# Patient Record
Sex: FEMALE | Race: WHITE | Employment: FULL TIME | URBAN - METROPOLITAN AREA
[De-identification: names, ages, dates, MRNs, and addresses within clinical notes are randomized per-mention and may not be internally consistent; named-entity substitution may affect disease eponyms.]

---

## 2020-02-18 ENCOUNTER — OFFICE VISIT (OUTPATIENT)
Dept: FAMILY MEDICINE CLINIC | Age: 35
End: 2020-02-18
Payer: COMMERCIAL

## 2020-02-18 VITALS
SYSTOLIC BLOOD PRESSURE: 112 MMHG | WEIGHT: 133 LBS | DIASTOLIC BLOOD PRESSURE: 72 MMHG | BODY MASS INDEX: 24.48 KG/M2 | HEART RATE: 67 BPM | HEIGHT: 62 IN | OXYGEN SATURATION: 97 %

## 2020-02-18 PROBLEM — M54.50 CHRONIC RIGHT-SIDED LOW BACK PAIN WITHOUT SCIATICA: Status: ACTIVE | Noted: 2020-02-18

## 2020-02-18 PROBLEM — G89.29 CHRONIC RIGHT-SIDED LOW BACK PAIN WITHOUT SCIATICA: Status: ACTIVE | Noted: 2020-02-18

## 2020-02-18 PROBLEM — F33.1 MODERATE EPISODE OF RECURRENT MAJOR DEPRESSIVE DISORDER (HCC): Status: ACTIVE | Noted: 2020-02-18

## 2020-02-18 PROBLEM — G56.21 NEUROPATHY OF RIGHT ULNAR NERVE AT WRIST: Status: ACTIVE | Noted: 2020-02-18

## 2020-02-18 PROCEDURE — G8484 FLU IMMUNIZE NO ADMIN: HCPCS | Performed by: FAMILY MEDICINE

## 2020-02-18 PROCEDURE — G8427 DOCREV CUR MEDS BY ELIG CLIN: HCPCS | Performed by: FAMILY MEDICINE

## 2020-02-18 PROCEDURE — 99203 OFFICE O/P NEW LOW 30 MIN: CPT | Performed by: FAMILY MEDICINE

## 2020-02-18 PROCEDURE — G8420 CALC BMI NORM PARAMETERS: HCPCS | Performed by: FAMILY MEDICINE

## 2020-02-18 PROCEDURE — 4004F PT TOBACCO SCREEN RCVD TLK: CPT | Performed by: FAMILY MEDICINE

## 2020-02-18 RX ORDER — SERTRALINE HYDROCHLORIDE 100 MG/1
150 TABLET, FILM COATED ORAL DAILY
Qty: 45 TABLET | Refills: 0 | COMMUNITY
Start: 2020-02-18 | End: 2020-02-28 | Stop reason: SDUPTHER

## 2020-02-18 RX ORDER — SERTRALINE HYDROCHLORIDE 100 MG/1
100 TABLET, FILM COATED ORAL DAILY
COMMUNITY
End: 2020-02-18 | Stop reason: SDUPTHER

## 2020-02-18 RX ORDER — TRAZODONE HYDROCHLORIDE 100 MG/1
100 TABLET ORAL DAILY
COMMUNITY
End: 2020-06-23 | Stop reason: SDUPTHER

## 2020-02-18 SDOH — HEALTH STABILITY: MENTAL HEALTH: HOW OFTEN DO YOU HAVE A DRINK CONTAINING ALCOHOL?: NEVER

## 2020-02-18 ASSESSMENT — ENCOUNTER SYMPTOMS: RESPIRATORY NEGATIVE: 1

## 2020-02-18 NOTE — PROGRESS NOTES
Subjective:      Patient ID: Kay Kirby is a 38 y.o. female. HPI Pt is a of 38 y.o. female comes in today with   Chief Complaint   Patient presents with    New Patient     Patient needs to establish with new PCP. History of anxiety, depression and mood swings. On zoloft since 2019. Just recently put on trazodone. [facility] with psychiatrist [facility]. Has been healthy. Some right lower back pain. Worse if on feet too long. Going on for 2 years. Just takes ibu prn. No Known Allergies    Current Outpatient Medications on File Prior to Visit   Medication Sig Dispense Refill    sertraline (ZOLOFT) 100 MG tablet Take 100 mg by mouth daily       traZODone (DESYREL) 100 MG tablet Take 100 mg by mouth daily        No current facility-administered medications on file prior to visit.         Past Medical History:   Diagnosis Date    Anxiety     Depression     Insomnia     Mood swing        Past Surgical History:   Procedure Laterality Date     SECTION      EYE SURGERY         Social History     Socioeconomic History    Marital status: Single     Spouse name: None    Number of children: None    Years of education: None    Highest education level: None   Occupational History    None   Social Needs    Financial resource strain: None    Food insecurity:     Worry: None     Inability: None    Transportation needs:     Medical: None     Non-medical: None   Tobacco Use    Smoking status: Current Every Day Smoker     Packs/day: 0.35     Years: 20.00     Pack years: 7.00     Start date: 2000    Smokeless tobacco: Never Used   Substance and Sexual Activity    Alcohol use: Never     Frequency: Never    Drug use: Never    Sexual activity: None   Lifestyle    Physical activity:     Days per week: None     Minutes per session: None    Stress: None   Relationships    Social connections:     Talks on phone: None     Gets together: None     Attends Protestant service: None     Active member of club or organization: None     Attends meetings of clubs or organizations: None     Relationship status: None    Intimate partner violence:     Fear of current or ex partner: None     Emotionally abused: None     Physically abused: None     Forced sexual activity: None   Other Topics Concern    None   Social History Narrative    None       Social History     Substance and Sexual Activity   Drug Use Never       No family history on file. Review of Systems   Constitutional: Negative. Respiratory: Negative. Cardiovascular: Negative. Vitals:    02/18/20 1023   BP: 112/72   Site: Left Upper Arm   Position: Sitting   Cuff Size: Small Adult   Pulse: 67   SpO2: 97%   Weight: 133 lb (60.3 kg)   Height: 5' 2\" (1.575 m)      Objective:   Physical Exam  Constitutional:       General: She is not in acute distress. Appearance: She is well-developed. She is not diaphoretic. HENT:      Head: Normocephalic and atraumatic. Eyes:      General: No scleral icterus. Neck:      Musculoskeletal: Normal range of motion and neck supple. Thyroid: No thyroid mass or thyromegaly. Trachea: No tracheal deviation. Cardiovascular:      Rate and Rhythm: Normal rate and regular rhythm. Heart sounds: Normal heart sounds. No murmur. Pulmonary:      Effort: Pulmonary effort is normal.      Breath sounds: Normal breath sounds. Lymphadenopathy:      Head:      Right side of head: No submandibular adenopathy. Left side of head: No submandibular adenopathy. Cervical: No cervical adenopathy. Skin:     General: Skin is warm and dry. Findings: No rash. Neurological:      Mental Status: She is alert and oriented to person, place, and time. Cranial Nerves: No cranial nerve deficit. Psychiatric:         Behavior: Behavior normal.         Thought Content: Thought content normal.         Judgment: Judgment normal.         Assessment:       Diagnosis Orders   1.  Moderate episode of recurrent major depressive disorder ([facility] [address]     2. Chronic right-sided low back pain without sciatica     3. 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          Chrissie Avalos MD

## 2020-02-18 NOTE — PATIENT INSTRUCTIONS
floor on your back with your knees bent at a 90-degree angle. Your feet should be flat on the floor, about 12 inches from your buttocks. 2. Cross your arms over your chest. If this bothers your neck, try putting your hands behind your neck (not your head), with your elbows spread apart. 3. Slowly tighten your belly muscles and raise your shoulder blades off the floor. 4. Keep your head in line with your body, and do not press your chin to your chest.  5. Hold this position for 1 or 2 seconds, then slowly lower yourself back down to the floor. 6. Repeat 8 to 12 times. Pelvic tilt exercise   1. Lie on your back with your knees bent. 2. \"Brace\" your stomach. This means to tighten your muscles by pulling in and imagining your belly button moving toward your spine. You should feel like your back is pressing to the floor and your hips and pelvis are rocking back. 3. Hold for about 6 seconds while you breathe smoothly. 4. Repeat 8 to 12 times. Heel dig bridging   1. Lie on your back with both knees bent and your ankles bent so that only your heels are digging into the floor. Your knees should be bent about 90 degrees. 2. Then push your heels into the floor, squeeze your buttocks, and lift your hips off the floor until your shoulders, hips, and knees are all in a straight line. 3. Hold for about 6 seconds as you continue to breathe normally, and then slowly lower your hips back down to the floor and rest for up to 10 seconds. 4. Do 8 to 12 repetitions. Hamstring stretch in doorway   1. Lie on your back in a doorway, with one leg through the open door. 2. Slide your leg up the wall to straighten your knee. You should feel a gentle stretch down the back of your leg. 3. Hold the stretch for at least 15 to 30 seconds. Do not arch your back, point your toes, or bend either knee. Keep one heel touching the floor and the other heel touching the wall. 4. Repeat with your other leg.   5. Do 2 to 4 times for each leg.    Hip flexor stretch   1. Kneel on the floor with one knee bent and one leg behind you. Place your forward knee over your foot. Keep your other knee touching the floor. 2. Slowly push your hips forward until you feel a stretch in the upper thigh of your rear leg. 3. Hold the stretch for at least 15 to 30 seconds. Repeat with your other leg. 4. Do 2 to 4 times on each side. Wall sit   1. Stand with your back 10 to 12 inches away from a wall. 2. Lean into the wall until your back is flat against it. 3. Slowly slide down until your knees are slightly bent, pressing your lower back into the wall. 4. Hold for about 6 seconds, then slide back up the wall. 5. Repeat 8 to 12 times. Follow-up care is a key part of your treatment and safety. Be sure to make and go to all appointments, and call your doctor if you are having problems. It's also a good idea to know your test results and keep a list of the medicines you take. Where can you learn more? Go to https://[employer]. org and sign in to your [employer] account. Enter L376 in the [employer] box to learn more about \"Low Back Pain: Exercises. \"     If you do not have an account, please click on the \"Sign Up Now\" link. Current as of: June 26, 2019  Content Version: 12.3  © 7506-2619 Healthwise, Incorporated. Care instructions adapted under license by [facility] ([facility]). If you have questions about a medical condition or this instruction, always ask your healthcare professional. [place] [address] any warranty or liability for your use of this information.

## 2020-02-28 RX ORDER — SERTRALINE HYDROCHLORIDE 100 MG/1
150 TABLET, FILM COATED ORAL DAILY
Qty: 45 TABLET | Refills: 5 | Status: SHIPPED | OUTPATIENT
Start: 2020-02-28 | End: 2020-06-23 | Stop reason: SDUPTHER

## 2020-03-20 NOTE — PROGRESS NOTES
3/21/2020    FLU/COVID-19 CLINIC EVALUATION    HPI  SYMPTOMS:    Symptom duration, days:  [] 1   [x] 2   [] 3   [] 4   [] 5   [] 6   [] 7   [] 8   [] 9   [] 10   [] 11   [] 12   [] 13   [] 14 or greater    Symptom course:   [x] Worsening     [] Stable     [] Improving    [x] Fevers  [] Symptom (not measured)  [x] Measured (Result: )100. [] Chills  [x] Cough  [] Coughing up blood  [] Chest Congestion  [x] Nasal Congestion  [x] Feeling short of breath  [] Sometimes  [] Frequently  [] All the time  [] Chest pain  [] Headaches  []Tolerable  [] Severe  [] Sore throat  [] Muscle aches  [] Nausea  [] Vomiting  []Unable to keep fluids down  [] Diarrhea  []Severe    [] OTHER SYMPTOMS:      RISK FACTORS:    [] Pregnant or possibly pregnant  [] Age over 61  [] Diabetes  [] Heart disease  [] Asthma  [] COPD/Other chronic lung diseases  [] Active Cancer  [] On Chemotherapy  [] Taking oral steroids  [] History Lymphoma/Leukemia  [] Close contact with a lab confirmed COVID-19 patient within 14 days of symptom onset  [] History of travel from affected geographical areas within 14 days of symptom onset       VITALS:  Vitals:    03/21/20 0846   Pulse: 79   Temp: 98 °F (36.7 °C)   SpO2: 98%        TESTS:    POCT FLU:  [] Positive     []Negative    [] COVID-19 Test sent:      ASSESSMENT:    [x] Flu  [] Possible COVID-19    PLAN:    [x] Discharge home with written instructions for:  [x] Flu management  [] Possible COVID-19 infection with self-quarantine and management of symptoms  [x] Follow-up with primary care physician or emergency department if worsens  [] Evaluation per physician/nurse practitioner in clinic      An  electronic signature was used to authenticate this note.      --Julius Grant, APRN - CNP on 3/21/2020 at 8:48 AM

## 2020-03-20 NOTE — PATIENT INSTRUCTIONS
hands are visibly dirty.   ; Avoid touching: Avoid touching your eyes, nose, and mouth with unwashed hands. Handwashing Tips   ; Wet your hands with clean, running water (warm or cold), turn off the tap, and apply soap.  ; Lather your hands by rubbing them together with the soap. Lather the backs of your hands, between your fingers, and under your nails. ; Scrub your hands for at least 20 seconds. Need a timer? Hum the [place] from beginning to end twice.  ; Rinse your hands well under clean, running water.  ; Dry your hands using a clean towel or air dry them. Avoid sharing personal household items   ; Do not share: You should not share dishes, drinking glasses, cups, eating utensils, towels, or bedding with other people or pets in your home.   ; Wash thoroughly after use: After using these items, they should be washed thoroughly with soap and water. Clean all high-touch surfaces everyday   ; Clean and disinfect: Practice routine cleaning of high touch surfaces.  ; High touch surfaces include counters, tabletops, doorknobs, bathroom fixtures, toilets, phones, keyboards, tablets, and bedside tables.  ; Disinfect areas with bodily fluids: Also, clean any surfaces that may have blood, stool, or body fluids on them.   ; Household : Use a household cleaning spray or wipe, according to the label instructions. Labels contain instructions for safe and effective use of the cleaning product including precautions you should take when applying the product, such as wearing gloves and making sure you have good ventilation during use of the product.     Monitor your symptoms   Seek medical attention: Seek prompt medical attention if your illness is worsening     (e.g., difficulty breathing).   ; Call your doctor: Before seeking care, call your healthcare provider and tell them that you have, or are being evaluated for, COVID-19.   ; Wear a facemask when sick: Put on a facemask before you enter the

## 2020-03-21 ENCOUNTER — OFFICE VISIT (OUTPATIENT)
Dept: PRIMARY CARE CLINIC | Age: 35
End: 2020-03-21
Payer: COMMERCIAL

## 2020-03-21 VITALS — TEMPERATURE: 98 F | HEART RATE: 79 BPM | OXYGEN SATURATION: 98 %

## 2020-03-21 LAB
INFLUENZA A ANTIBODY: NORMAL
INFLUENZA B ANTIBODY: NORMAL

## 2020-03-21 PROCEDURE — 87804 INFLUENZA ASSAY W/OPTIC: CPT | Performed by: NURSE PRACTITIONER

## 2020-03-21 PROCEDURE — G8428 CUR MEDS NOT DOCUMENT: HCPCS | Performed by: NURSE PRACTITIONER

## 2020-03-21 PROCEDURE — 4004F PT TOBACCO SCREEN RCVD TLK: CPT | Performed by: NURSE PRACTITIONER

## 2020-03-21 PROCEDURE — G8484 FLU IMMUNIZE NO ADMIN: HCPCS | Performed by: NURSE PRACTITIONER

## 2020-03-21 PROCEDURE — 99212 OFFICE O/P EST SF 10 MIN: CPT | Performed by: NURSE PRACTITIONER

## 2020-03-21 PROCEDURE — G8420 CALC BMI NORM PARAMETERS: HCPCS | Performed by: NURSE PRACTITIONER

## 2020-06-23 RX ORDER — TRAZODONE HYDROCHLORIDE 100 MG/1
100 TABLET ORAL DAILY
Qty: 30 TABLET | Refills: 2 | Status: SHIPPED | OUTPATIENT
Start: 2020-06-23 | End: 2020-09-25 | Stop reason: SDUPTHER

## 2020-06-23 RX ORDER — SERTRALINE HYDROCHLORIDE 100 MG/1
150 TABLET, FILM COATED ORAL DAILY
Qty: 45 TABLET | Refills: 5 | Status: SHIPPED | OUTPATIENT
Start: 2020-06-23 | End: 2021-04-27 | Stop reason: SDUPTHER

## 2020-07-30 ENCOUNTER — TELEPHONE (OUTPATIENT)
Dept: FAMILY MEDICINE CLINIC | Age: 35
End: 2020-07-30

## 2020-07-30 NOTE — TELEPHONE ENCOUNTER
----- Message from Inez Shah sent at 7/30/2020  9:16 AM EDT -----  Subject: Message to Provider    QUESTIONS  Information for Provider? Patient has a STD and has a flare up with it. She got meds from another provider in past. Celia Taylor can something be called in ? can do a virtual today if need be. [facility]  ---------------------------------------------------------------------------  --------------  [address]  What is the best way for the office to contact you? OK to leave message on   voicemail  Preferred Call Back Phone Number? [phone]  ---------------------------------------------------------------------------  --------------  SCRIPT ANSWERS  Relationship to Patient?  Self

## 2020-07-31 ENCOUNTER — OFFICE VISIT (OUTPATIENT)
Dept: FAMILY MEDICINE CLINIC | Age: 35
End: 2020-07-31
Payer: COMMERCIAL

## 2020-07-31 VITALS
OXYGEN SATURATION: 97 % | SYSTOLIC BLOOD PRESSURE: 102 MMHG | WEIGHT: 133.2 LBS | DIASTOLIC BLOOD PRESSURE: 60 MMHG | HEART RATE: 82 BPM | TEMPERATURE: 97.9 F | BODY MASS INDEX: 24.51 KG/M2 | HEIGHT: 62 IN

## 2020-07-31 PROCEDURE — 99213 OFFICE O/P EST LOW 20 MIN: CPT | Performed by: NURSE PRACTITIONER

## 2020-07-31 PROCEDURE — G8427 DOCREV CUR MEDS BY ELIG CLIN: HCPCS | Performed by: NURSE PRACTITIONER

## 2020-07-31 PROCEDURE — G8420 CALC BMI NORM PARAMETERS: HCPCS | Performed by: NURSE PRACTITIONER

## 2020-07-31 PROCEDURE — 4004F PT TOBACCO SCREEN RCVD TLK: CPT | Performed by: NURSE PRACTITIONER

## 2020-07-31 RX ORDER — VALACYCLOVIR HYDROCHLORIDE 1 G/1
1000 TABLET, FILM COATED ORAL DAILY
Qty: 5 TABLET | Refills: 3 | Status: SHIPPED | OUTPATIENT
Start: 2020-07-31 | End: 2021-06-15

## 2020-07-31 RX ORDER — MOXIFLOXACIN 5 MG/ML
1 SOLUTION/ DROPS OPHTHALMIC DAILY
COMMUNITY
End: 2021-06-15 | Stop reason: ALTCHOICE

## 2020-07-31 RX ORDER — LOTEPREDNOL ETABONATE 10 MG/ML
SUSPENSION TOPICAL DAILY
COMMUNITY
End: 2021-06-15 | Stop reason: ALTCHOICE

## 2020-07-31 RX ORDER — ALBUTEROL SULFATE 90 UG/1
2 AEROSOL, METERED RESPIRATORY (INHALATION)
Qty: 1 INHALER | Refills: 1 | Status: SHIPPED | OUTPATIENT
Start: 2020-07-31 | End: 2022-04-26 | Stop reason: SDUPTHER

## 2020-07-31 ASSESSMENT — ENCOUNTER SYMPTOMS
ABDOMINAL PAIN: 0
COUGH: 0
WHEEZING: 1
SHORTNESS OF BREATH: 0

## 2020-07-31 NOTE — PROGRESS NOTES
[facility]  [facility]  [address] [place] [address]: [phone]         2020     Priyanka Meadows (:  1985) is a 38 y.o. female, here for evaluation of the following medical concerns:    Chief Complaint   Patient presents with    Herpes Zoster     flare up; needs meds refilled for herpes        HPI  Tuesday- Started feeling irritated  Wednesday- Worse  Yesterday- started burning when water touched it  Outbreak- 2nd this year  Worse with sitting certain ways  Has been stressed  Tried gold bond    Review of Systems   Constitutional: Negative for activity change, appetite change, fatigue and fever. Respiratory: Positive for wheezing. Negative for cough and shortness of breath. Gastrointestinal: Negative for abdominal pain. Genitourinary: Positive for genital sores. Negative for vaginal bleeding, vaginal discharge and vaginal pain. Prior to Visit Medications    Medication Sig Taking?  Authorizing Provider   Loteprednol Etabonate (INVELTYS) 1 % SUSP Apply to eye daily Yes Historical Provider, MD   valACYclovir (VALTREX) 1 g tablet Take 1 tablet by mouth daily Yes GABRIEL Castro CNP   albuterol sulfate HFA (VENTOLIN HFA) 108 (90 Base) MCG/ACT inhaler Inhale 2 puffs into the lungs every 4-6 hours as needed for Wheezing Yes GABRIEL Castro CNP   moxifloxacin (VIGAMOX) 0.5 % ophthalmic solution Place 1 drop into the left eye daily Yes Historical Provider, MD   sertraline (ZOLOFT) 100 MG tablet Take 1.5 tablets by mouth daily Yes Kamilla Esposito MD   traZODone (DESYREL) 100 MG tablet Take 1 tablet by mouth daily Yes Kamilla Esposito MD        Social History     Tobacco Use    Smoking status: Current Every Day Smoker     Packs/day: 0.35     Years: 20.00     Pack years: 7.00     Start date: 2000    Smokeless tobacco: Never Used   Substance Use Topics    Alcohol use: Never     Frequency: Never        Vitals: 07/31/20 0849   BP: 102/60   Site: Left Upper Arm   Position: Sitting   Cuff Size: Medium Adult   Pulse: 82   Temp: 97.9 °F (36.6 °C)   TempSrc: Temporal   SpO2: 97%   Weight: 133 lb 3.2 oz (60.4 kg)   Height: 5' 2\" (1.575 m)     Estimated body mass index is 24.36 kg/m² as calculated from the following:    Height as of this encounter: 5' 2\" (1.575 m). Weight as of this encounter: 133 lb 3.2 oz (60.4 kg). Physical Exam  Vitals signs reviewed. Constitutional:       Appearance: Normal appearance. HENT:      Head: Normocephalic. Cardiovascular:      Rate and Rhythm: Normal rate and regular rhythm. Pulses: Normal pulses. Heart sounds: Normal heart sounds, S1 normal and S2 normal.   Pulmonary:      Effort: Pulmonary effort is normal.      Breath sounds: Normal air entry. Wheezing present. Abdominal:      Palpations: Abdomen is soft. Tenderness: There is no abdominal tenderness. Genitourinary:     Labia:         Left: Lesion present. Comments: Painful to touch  Neurological:      Mental Status: She is alert. Psychiatric:         Mood and Affect: Mood normal.         ASSESSMENT/PLAN:  1. Recurrent genital herpes  Stable;  Begin valtrex. - valACYclovir (VALTREX) 1 g tablet; Take 1 tablet by mouth daily  Dispense: 5 tablet; Refill: 3    2. Wheezing  Stable;  Per patient mostly in the AM.  Begin albuterol inhaler PRN. Discussed considering PFTs. Patient is a current smoker. - albuterol sulfate HFA (VENTOLIN HFA) 108 (90 Base) MCG/ACT inhaler; Inhale 2 puffs into the lungs every 4-6 hours as needed for Wheezing  Dispense: 1 Inhaler; Refill: 1    3. Healthcare maintenance  Stable;  Referral to Dr. Reji Marino. - Jason Solorzano MD, Gynecology, [facility]      Follow Up:     Return in about 4 weeks (around 8/28/2020).

## 2020-08-18 ENCOUNTER — TELEPHONE (OUTPATIENT)
Dept: GYNECOLOGY | Age: 35
End: 2020-08-18

## 2020-08-27 ENCOUNTER — OFFICE VISIT (OUTPATIENT)
Dept: GYNECOLOGY | Age: 35
End: 2020-08-27
Payer: COMMERCIAL

## 2020-08-27 VITALS — TEMPERATURE: 98.4 F | RESPIRATION RATE: 18 BRPM | BODY MASS INDEX: 24.31 KG/M2 | WEIGHT: 132.13 LBS | HEIGHT: 62 IN

## 2020-08-27 DIAGNOSIS — N92.0 MENORRHAGIA WITH REGULAR CYCLE: ICD-10-CM

## 2020-08-27 LAB
BACTERIA WET PREP: NORMAL
CLUE CELLS: NORMAL
EPITHELIAL CELLS WET PREP: NORMAL
HCT VFR BLD CALC: 39.6 % (ref 36–48)
HEMOGLOBIN: 13.2 G/DL (ref 12–16)
MCH RBC QN AUTO: 30.7 PG (ref 26–34)
MCHC RBC AUTO-ENTMCNC: 33.5 G/DL (ref 31–36)
MCV RBC AUTO: 91.9 FL (ref 80–100)
PDW BLD-RTO: 12.9 % (ref 12.4–15.4)
PLATELET # BLD: 252 K/UL (ref 135–450)
PMV BLD AUTO: 8.8 FL (ref 5–10.5)
RBC # BLD: 4.31 M/UL (ref 4–5.2)
RBC WET PREP: NORMAL
SOURCE WET PREP: NORMAL
TRICHOMONAS PREP: NORMAL
TSH REFLEX: 1.85 UIU/ML (ref 0.27–4.2)
WBC # BLD: 5.5 K/UL (ref 4–11)
WBC WET PREP: NORMAL
YEAST WET PREP: NORMAL

## 2020-08-27 PROCEDURE — 99385 PREV VISIT NEW AGE 18-39: CPT | Performed by: OBSTETRICS & GYNECOLOGY

## 2020-08-27 RX ORDER — VALACYCLOVIR HYDROCHLORIDE 1 G/1
TABLET, FILM COATED ORAL
Qty: 10 TABLET | Refills: 1 | Status: SHIPPED | OUTPATIENT
Start: 2020-08-27 | End: 2021-04-14 | Stop reason: SDUPTHER

## 2020-08-27 ASSESSMENT — ENCOUNTER SYMPTOMS
ABDOMINAL DISTENTION: 0
CHEST TIGHTNESS: 0
APNEA: 0
VOMITING: 0
BACK PAIN: 0
WHEEZING: 0
SHORTNESS OF BREATH: 0
ANAL BLEEDING: 0
COUGH: 0
COLOR CHANGE: 0
RECTAL PAIN: 0
DIARRHEA: 0
PHOTOPHOBIA: 0
SORE THROAT: 0
CONSTIPATION: 0
NAUSEA: 0
ABDOMINAL PAIN: 0
BLOOD IN STOOL: 0
TROUBLE SWALLOWING: 0

## 2020-08-27 NOTE — PROGRESS NOTES
Annual GYN Visit    Priyanka Sapp  Date ofBirth:  1985    Date of Service:  2020    Chief Complaint:   Marianela Grossman is a 38 y.o. [place] female who presents for routine annual gynecologic visit. HPI:  Patient is premenopausal- Patient's last menstrual period was 2020 (exact date). .  Menses are regular but very heavy on day 1 and 2, last total of 6 days, she denies intermenstrual bleeding. She has been  for 12 years, consented to STD screening, history of genital herpes since  and has about 1- 2 outbreaks a year. BCM- tubal ligation at time of last .  Had a salpingectomy for ectopic     Health Maintenance   Topic Date Due    Varicella vaccine (1 of 2 - 2-dose childhood series) 1985    Pneumococcal 0-64 years Vaccine (1 of 1 - PPSV23) 1990    HIV screen  1999    DTaP/Tdap/Td vaccine (1 - Tdap) 2003    Cervical cancer screen  2005    Flu vaccine (1) 2020    Hepatitis A vaccine  Aged Out    Hepatitis B vaccine  Aged Out    Hib vaccine  Aged Out    Meningococcal (ACWY) vaccine  Aged Out       Past Medical History:   Diagnosis Date    Anxiety     Depression     Herpes simplex virus (HSV) infection     Insomnia     Mood swing      Past Surgical History:   Procedure Laterality Date     SECTION      EYE SURGERY       OB History    Para Term  AB Living   7 4 4   3 4   SAB TAB Ectopic Molar Multiple Live Births       0     4      # Outcome Date GA Lbr Paul/2nd Weight Sex Delivery Anes PTL Lv   7 Term 14     CS-Unspec   JACQUES   6 Term    7 lb 13 oz (3.544 kg) M CS-LTranv   JACQUES   5 Term    6 lb (2.722 kg) M CS-LTranv   JACQUES   4 AB 2006           3 AB 2004           2 Term    6 lb 4 oz (2.835 kg) F Vag-Spont   JACQUES   1 AB              Social History     Socioeconomic History    Marital status: Single     Spouse name: Not on file    Number of children: Not on file    Years of education: Not on sertraline (ZOLOFT) 100 MG tablet Take 1.5 tablets by mouth daily 45 tablet 5    traZODone (DESYREL) 100 MG tablet Take 1 tablet by mouth daily 30 tablet 2     Family History   Problem Relation Age of Onset    Cancer Maternal Grandmother         lymphoma    Diabetes Maternal Grandmother     Diabetes Maternal Grandfather          Review of Systems:  Review of Systems   Constitutional: Negative for appetite change, chills, fatigue, fever and unexpected weight change. HENT: Negative for ear pain, hearing loss, mouth sores, sore throat and trouble swallowing. Eyes: Negative for photophobia and visual disturbance. Respiratory: Negative for apnea, cough, chest tightness, shortness of breath and wheezing. Cardiovascular: Negative for chest pain, palpitations and leg swelling. Gastrointestinal: Negative for abdominal distention, abdominal pain, anal bleeding, blood in stool, constipation, diarrhea, nausea, rectal pain and vomiting. Endocrine: Negative for cold intolerance, heat intolerance, polydipsia, polyphagia and polyuria. Genitourinary: Positive for menstrual problem. Negative for difficulty urinating, dyspareunia, dysuria, enuresis, frequency, genital sores, hematuria, pelvic pain, urgency, vaginal bleeding, vaginal discharge and vaginal pain. Musculoskeletal: Negative for arthralgias, back pain, joint swelling and myalgias. Skin: Negative for color change and rash. Neurological: Negative for dizziness, seizures, syncope, light-headedness and headaches. Psychiatric/Behavioral: Negative for agitation, behavioral problems, confusion, decreased concentration, dysphoric mood, hallucinations, self-injury, sleep disturbance and suicidal ideas. The patient is not nervous/anxious and is not hyperactive.         Physical Exam:  Temp 98.4 °F (36.9 °C)   Resp 18   Ht 5' 2\" (1.575 m)   Wt 132 lb 2 oz (59.9 kg)   LMP 08/16/2020 (Exact Date)   Breastfeeding No   BMI 24.17 kg/m²   BP Readings from Last 3 Encounters:   07/31/20 102/60   02/18/20 112/72     Body mass index is 24.17 kg/m². Physical Exam  Constitutional:       General: She is not in acute distress. Appearance: She is well-developed. HENT:      Head: Normocephalic and atraumatic. Mouth/Throat:      Pharynx: No oropharyngeal exudate. Eyes:      General: No scleral icterus. Right eye: No discharge. Left eye: No discharge. Conjunctiva/sclera: Conjunctivae normal.   Neck:      Thyroid: No thyromegaly. Cardiovascular:      Rate and Rhythm: Normal rate and regular rhythm. Heart sounds: Normal heart sounds. Pulmonary:      Effort: Pulmonary effort is normal.      Breath sounds: Normal breath sounds. Abdominal:      General: Bowel sounds are normal. There is no distension. Palpations: Abdomen is soft. There is no mass. Tenderness: There is no abdominal tenderness. There is no guarding or rebound. Genitourinary:     Labia:         Right: Lesion (5 mm raised cauliflower lesion ) present. No rash, tenderness or injury. Left: Lesion (1 cm sebaceous gland cyst ) present. No rash, tenderness or injury. Vagina: Normal. No signs of injury and foreign body. No vaginal discharge, erythema or tenderness. Cervix: No cervical motion tenderness, discharge or friability. Uterus: Not deviated, not enlarged, not fixed and not tender. Adnexa:         Right: No mass, tenderness or fullness. Left: No mass, tenderness or fullness. Rectum: No mass or external hemorrhoid. Skin:     General: Skin is warm. Coloration: Skin is not pale. Findings: No erythema or rash. Neurological:      Mental Status: She is alert. Psychiatric:         Behavior: Behavior normal. Behavior is cooperative. Thought Content: Thought content normal.         Judgment: Judgment normal.           Assessment/Plan:  1.  Well woman exam with routine gynecological exam  - PAP SMEAR  Self breast exam discussed and encouraged  Diet and exercise discussed  Discussed daily multi-vitamin and adequate calcium and vitamin D in diet  Safe sex and usage of condoms discussed   BCM - tubal ligation     2. Menorrhagia with regular cycle  All treatment options including medical and surgical discussed - she may want a uterine ablation   Final plan after pelvic ultrasound   - US PELVIS COMPLETE; Future  - CBC; Future  - TSH with Reflex; Future    3. Screen for STD (sexually transmitted disease)  - C.trachomatis N.gonorrhoeae DNA  - Wet prep, genital      4. Genital herpes simplex, unspecified site  Valtrex 1 gram bid x 5 days prn     5. Lesion of vulva  Plan for excisional biopsy in office next visit       Return in about 2 weeks (around 9/10/2020).

## 2020-08-28 LAB
C TRACH DNA GENITAL QL NAA+PROBE: NEGATIVE
N. GONORRHOEAE DNA: NEGATIVE

## 2020-09-08 ENCOUNTER — PROCEDURE VISIT (OUTPATIENT)
Dept: GYNECOLOGY | Age: 35
End: 2020-09-08
Payer: COMMERCIAL

## 2020-09-08 VITALS — HEIGHT: 62 IN | WEIGHT: 132.13 LBS | RESPIRATION RATE: 18 BRPM | BODY MASS INDEX: 24.31 KG/M2

## 2020-09-08 PROCEDURE — 56606 BIOPSY OF VULVA/PERINEUM: CPT | Performed by: OBSTETRICS & GYNECOLOGY

## 2020-09-08 PROCEDURE — 56605 BIOPSY OF VULVA/PERINEUM: CPT | Performed by: OBSTETRICS & GYNECOLOGY

## 2020-09-08 PROCEDURE — G8420 CALC BMI NORM PARAMETERS: HCPCS | Performed by: OBSTETRICS & GYNECOLOGY

## 2020-09-08 PROCEDURE — 10060 I&D ABSCESS SIMPLE/SINGLE: CPT | Performed by: OBSTETRICS & GYNECOLOGY

## 2020-09-08 PROCEDURE — G8427 DOCREV CUR MEDS BY ELIG CLIN: HCPCS | Performed by: OBSTETRICS & GYNECOLOGY

## 2020-09-08 PROCEDURE — 4004F PT TOBACCO SCREEN RCVD TLK: CPT | Performed by: OBSTETRICS & GYNECOLOGY

## 2020-09-08 PROCEDURE — 99213 OFFICE O/P EST LOW 20 MIN: CPT | Performed by: OBSTETRICS & GYNECOLOGY

## 2020-09-08 RX ORDER — LIDOCAINE AND PRILOCAINE 25; 25 MG/G; MG/G
CREAM TOPICAL
Qty: 60 G | Refills: 0 | Status: SHIPPED | OUTPATIENT
Start: 2020-09-08 | End: 2020-11-17

## 2020-09-08 ASSESSMENT — ENCOUNTER SYMPTOMS
PHOTOPHOBIA: 0
CHEST TIGHTNESS: 0
ABDOMINAL DISTENTION: 0
WHEEZING: 0
BLOOD IN STOOL: 0
COUGH: 0
SHORTNESS OF BREATH: 0
DIARRHEA: 0
BACK PAIN: 0
VOMITING: 0
SORE THROAT: 0
TROUBLE SWALLOWING: 0
ABDOMINAL PAIN: 0
APNEA: 0
RECTAL PAIN: 0
NAUSEA: 0
ANAL BLEEDING: 0
COLOR CHANGE: 0
CONSTIPATION: 0

## 2020-09-08 NOTE — PROGRESS NOTES
Annual GYN Visit    Priyanka Malik Senait  Date ofBirth:  1985    Date of Service:  2020    Chief Complaint:   Marianela Grossman is a 38 y.o. [id number]  female who presents for removal of lesions of vulva and follow-up on heavy menses      HPI:  Patient is premenopausal- Patient's last menstrual period was 2020 (exact date). .  She is here for removal of vulvar lesions and follow-up on heavy menses.  Menses are regular but very heavy on day 1 and 2, last total of 6 days, she denies intermenstrual bleeding      Health Maintenance   Topic Date Due    Varicella vaccine (1 of 2 - 2-dose childhood series) 1985    Pneumococcal 0-64 years Vaccine (1 of 1 - PPSV23) 1990    HIV screen  1999    DTaP/Tdap/Td vaccine (1 - Tdap) 2003    Flu vaccine (1) 2020    Cervical cancer screen  2023    Hepatitis A vaccine  Aged Out    Hepatitis B vaccine  Aged Out    Hib vaccine  Aged Out    Meningococcal (ACWY) vaccine  Aged Out       Past Medical History:   Diagnosis Date    Anxiety     Depression     Herpes simplex virus (HSV) infection     Insomnia     Mood swing      Past Surgical History:   Procedure Laterality Date     SECTION      EYE SURGERY       OB History    Para Term  AB Living   7 4 4   3 4   SAB TAB Ectopic Molar Multiple Live Births       0     4      # Outcome Date GA Lbr Paul/2nd Weight Sex Delivery Anes PTL Lv   7 Term 14     CS-Unspec   JACQUES   6 Term    7 lb 13 oz (3.544 kg) M CS-LTranv   JACQUES   5 Term    6 lb (2.722 kg) M CS-LTranv   JACQUES   4 AB 2006           3 AB 2004           2 Term    6 lb 4 oz (2.835 kg) F Vag-Spont   JACQUES   1 AB              Social History     Socioeconomic History    Marital status: Single     Spouse name: Not on file    Number of children: Not on file    Years of education: Not on file    Highest education level: Not on file   Occupational History    Not on file   Social Needs    Financial resource strain: Not on file    Food insecurity     Worry: Not on file     Inability: Not on file    Transportation needs     Medical: Not on file     Non-medical: Not on file   Tobacco Use    Smoking status: Current Every Day Smoker     Packs/day: 0.35     Years: 20.00     Pack years: 7.00     Start date: 1/1/2000    Smokeless tobacco: Never Used   Substance and Sexual Activity    Alcohol use: Never     Frequency: Never    Drug use: Never    Sexual activity: Yes     Partners: Male   Lifestyle    Physical activity     Days per week: Not on file     Minutes per session: Not on file    Stress: Not on file   Relationships    Social connections     Talks on phone: Not on file     Gets together: Not on file     Attends Yazidism service: Not on file     Active member of club or organization: Not on file     Attends meetings of clubs or organizations: Not on file     Relationship status: Not on file    Intimate partner violence     Fear of current or ex partner: Not on file     Emotionally abused: Not on file     Physically abused: Not on file     Forced sexual activity: Not on file   Other Topics Concern    Not on file   Social History Narrative    Not on file     No Known Allergies  Outpatient Medications Marked as Taking for the 9/8/20 encounter (Procedure visit) with Ania Morejon MD   Medication Sig Dispense Refill    lidocaine-prilocaine (EMLA) 2.5-2.5 % cream Apply topically tid as needed.  60 g 0    Loteprednol Etabonate (INVELTYS) 1 % SUSP Apply to eye daily      valACYclovir (VALTREX) 1 g tablet Take 1 tablet by mouth daily 5 tablet 3    albuterol sulfate HFA (VENTOLIN HFA) 108 (90 Base) MCG/ACT inhaler Inhale 2 puffs into the lungs every 4-6 hours as needed for Wheezing 1 Inhaler 1    moxifloxacin (VIGAMOX) 0.5 % ophthalmic solution Place 1 drop into the left eye daily      sertraline (ZOLOFT) 100 MG tablet Take 1.5 tablets by mouth daily 45 tablet 5    traZODone (DESYREL) 100 MG tablet Take 1 tablet by mouth daily 30 tablet 2     Family History   Problem Relation Age of Onset    Cancer Maternal Grandmother         lymphoma    Diabetes Maternal Grandmother     Diabetes Maternal Grandfather          Review of Systems:  Review of Systems   Constitutional: Negative for appetite change, chills, fatigue, fever and unexpected weight change. HENT: Negative for ear pain, hearing loss, mouth sores, sore throat and trouble swallowing. Eyes: Negative for photophobia and visual disturbance. Respiratory: Negative for apnea, cough, chest tightness, shortness of breath and wheezing. Cardiovascular: Negative for chest pain, palpitations and leg swelling. Gastrointestinal: Negative for abdominal distention, abdominal pain, anal bleeding, blood in stool, constipation, diarrhea, nausea, rectal pain and vomiting. Endocrine: Negative for cold intolerance, heat intolerance, polydipsia, polyphagia and polyuria. Genitourinary: Positive for menstrual problem. Negative for difficulty urinating, dyspareunia, dysuria, enuresis, frequency, genital sores, hematuria, pelvic pain, urgency, vaginal bleeding, vaginal discharge and vaginal pain. Musculoskeletal: Negative for arthralgias, back pain, joint swelling and myalgias. Skin: Negative for color change and rash. Neurological: Negative for dizziness, seizures, syncope, light-headedness and headaches. Psychiatric/Behavioral: Negative for agitation, behavioral problems, confusion, decreased concentration, dysphoric mood, hallucinations, self-injury, sleep disturbance and suicidal ideas. The patient is not nervous/anxious and is not hyperactive. Physical Exam:  Resp 18   Ht 5' 2\" (1.575 m)   Wt 132 lb 2 oz (59.9 kg)   LMP 08/16/2020 (Exact Date)   Breastfeeding No   BMI 24.17 kg/m²   BP Readings from Last 3 Encounters:   07/31/20 102/60   02/18/20 112/72     Body mass index is 24.17 kg/m².   Physical Exam  Constitutional:       General: She is not in acute distress. Appearance: She is well-developed. HENT:      Head: Normocephalic and atraumatic. Mouth/Throat:      Pharynx: No oropharyngeal exudate. Eyes:      General: No scleral icterus. Right eye: No discharge. Left eye: No discharge. Conjunctiva/sclera: Conjunctivae normal.   Neck:      Thyroid: No thyromegaly. Cardiovascular:      Rate and Rhythm: Normal rate and regular rhythm. Heart sounds: Normal heart sounds. Pulmonary:      Effort: Pulmonary effort is normal.      Breath sounds: Normal breath sounds. Abdominal:      General: Bowel sounds are normal. There is no distension. Palpations: Abdomen is soft. There is no mass. Tenderness: There is no abdominal tenderness. There is no guarding or rebound. Genitourinary:     Labia:         Right: No rash, tenderness, lesion or injury. Left: No rash, tenderness, lesion or injury. Vagina: Normal. No signs of injury and foreign body. No vaginal discharge, erythema or tenderness. Cervix: No cervical motion tenderness, discharge or friability. Uterus: Not deviated, not enlarged, not fixed and not tender. Adnexa:         Right: No mass, tenderness or fullness. Left: No mass, tenderness or fullness. Rectum: No mass or external hemorrhoid. Comments: #1 and #2 - 5 mm raised hyperpigmented lesions - probable skin tags   #3 - 1 cm sebaceous gland cyst   Skin:     General: Skin is warm. Coloration: Skin is not pale. Findings: No erythema or rash. Neurological:      Mental Status: She is alert. Psychiatric:         Behavior: Behavior normal. Behavior is cooperative. Thought Content: Thought content normal.         Judgment: Judgment normal.     Procedure note   Excision of lesion of vulva x 2 and sebaceous cyst   Risks, benefits and alternatives of procedure discussed with patient and verbal consent obtained. Skin was prepped with betadine. Lidocaine 1% 1 cc was injected at the base of the lesion and the lesion was elevated with pick-ups and  completely excised with scalpel. Specimen and placed in formalin and sent to  pathology. . The second lesion was removed in similar fashion. The sebaceous gland cyst excised. Silver nitrate used for hemostasis. There was excellent hemostasis noted. EBL was minimal. Post-procedure instructions given. Assessment/Plan:  1. Vulvar lesion  3 lesions excised in office - see above  - lidocaine-prilocaine (EMLA) 2.5-2.5 % cream; Apply topically tid as needed. Dispense: 60 g; Refill: 0  - Surgical Pathology  - Surgical Pathology  - MS BIOPSY VULVA/PERINEUM,ONE LESN    2.  Menorrhagia with regular cycle  Pap smear normal  CBC normal  Pelvic ultrasound - not done by patient   She is not interested in any treatment options at this time - will keep a symptom dairy       Return if symptoms worsen or fail to improve, for ANNUAL EXAM.

## 2020-09-24 NOTE — TELEPHONE ENCOUNTER
Last Fill 6/23/20  Last Office Visit 7/31/20  Return in about 4 weeks (around 8/28/2020).   No Pending Appointments

## 2020-09-25 RX ORDER — TRAZODONE HYDROCHLORIDE 100 MG/1
100 TABLET ORAL DAILY
Qty: 30 TABLET | Refills: 2 | Status: SHIPPED | OUTPATIENT
Start: 2020-09-25 | End: 2021-01-26 | Stop reason: SDUPTHER

## 2020-11-17 ENCOUNTER — HOSPITAL ENCOUNTER (OUTPATIENT)
Dept: GENERAL RADIOLOGY | Age: 35
Discharge: HOME | End: 2020-11-17
Payer: COMMERCIAL

## 2020-11-17 ENCOUNTER — OFFICE VISIT (OUTPATIENT)
Dept: FAMILY MEDICINE CLINIC | Age: 35
End: 2020-11-17
Payer: COMMERCIAL

## 2020-11-17 ENCOUNTER — NURSE TRIAGE (OUTPATIENT)
Dept: OTHER | Facility: CLINIC | Age: 35
End: 2020-11-17

## 2020-11-17 VITALS
TEMPERATURE: 98.1 F | HEART RATE: 78 BPM | SYSTOLIC BLOOD PRESSURE: 120 MMHG | DIASTOLIC BLOOD PRESSURE: 74 MMHG | OXYGEN SATURATION: 98 % | WEIGHT: 133 LBS | HEIGHT: 62 IN | BODY MASS INDEX: 24.48 KG/M2

## 2020-11-17 PROCEDURE — G8484 FLU IMMUNIZE NO ADMIN: HCPCS | Performed by: FAMILY MEDICINE

## 2020-11-17 PROCEDURE — G8427 DOCREV CUR MEDS BY ELIG CLIN: HCPCS | Performed by: FAMILY MEDICINE

## 2020-11-17 PROCEDURE — 99213 OFFICE O/P EST LOW 20 MIN: CPT | Performed by: FAMILY MEDICINE

## 2020-11-17 PROCEDURE — 4004F PT TOBACCO SCREEN RCVD TLK: CPT | Performed by: FAMILY MEDICINE

## 2020-11-17 PROCEDURE — 72114 X-RAY EXAM L-S SPINE BENDING: CPT

## 2020-11-17 PROCEDURE — G8420 CALC BMI NORM PARAMETERS: HCPCS | Performed by: FAMILY MEDICINE

## 2020-11-17 RX ORDER — NAPROXEN 500 MG/1
500 TABLET ORAL 2 TIMES DAILY WITH MEALS
Qty: 20 TABLET | Refills: 0 | Status: SHIPPED | OUTPATIENT
Start: 2020-11-17 | End: 2021-06-15 | Stop reason: SDUPTHER

## 2020-11-17 RX ORDER — HYDROCODONE BITARTRATE AND ACETAMINOPHEN 5; 325 MG/1; MG/1
1 TABLET ORAL DAILY PRN
Qty: 10 TABLET | Refills: 0 | Status: SHIPPED | OUTPATIENT
Start: 2020-11-17 | End: 2020-11-27

## 2020-11-17 ASSESSMENT — ENCOUNTER SYMPTOMS
BACK PAIN: 1
VOMITING: 0
SORE THROAT: 0
NAUSEA: 0
ABDOMINAL PAIN: 0
BLOOD IN STOOL: 0
COUGH: 0
SHORTNESS OF BREATH: 0

## 2020-11-17 NOTE — PROGRESS NOTES
Chief Complaint   Patient presents with    Back Pain     pt fell at work reaching for heavy object and landed on [facility] 11/15/20, hurts to sit amd radiates into left leg            Priyanka Arroyo is a 38 y.o. female who presents complaining of constant low back pain s/p trauma 2 days ago. Pt was at work and reaching up for a tub of produce and fell backward and hit her lower back on a juan. Pt did not go to the ER and has been taking OTC ibuprofen w/o any relief. Pt denies any associated head trauma with the event    Pt denies any hx of diabetes or HTN    Patient has a past medical history significant for anxiety/depression and is on trazodone  and zoloft medications    Positive tobacco use 1/2 PPD but denies alcohol use      Review of Systems   Constitutional: Negative for fever. HENT: Negative for nosebleeds and sore throat. Eyes:        Negative blurred vision or diplopia   Respiratory: Negative for cough and shortness of breath. Cardiovascular: Negative for chest pain, palpitations and leg swelling. Gastrointestinal: Negative for abdominal pain, blood in stool, nausea and vomiting. Negative melena or indigestion   Endocrine: Negative for polydipsia and polyuria. Genitourinary: Negative for dysuria and hematuria. Musculoskeletal: Positive for back pain (8/10 in intensity and constant). Skin: Negative for rash. Positive bruising on back   Neurological: Negative for dizziness, seizures, syncope, speech difficulty, weakness and headaches. Psychiatric/Behavioral: Negative for dysphoric mood. The patient is not nervous/anxious. Vitals:    11/17/20 1500   BP: 120/74   Pulse: 78   Temp: 98.1 °F (36.7 °C)   SpO2: 98%         Physical Exam  Vitals signs reviewed. Constitutional:       General: She is in acute distress (moderate from back pain). HENT:      Mouth/Throat:      Mouth: Mucous membranes are moist.      Pharynx: Oropharynx is clear.    Eyes:      General: No scleral call for help? Call 911 anytime you think you may need emergency care. For example, call if:    · You are unable to move a leg at all.     · You have back pain with severe belly pain.     · You have symptoms of a heart attack. These may include:  ? Chest pain or pressure, or a strange feeling in the chest.  ? Sweating. ? Shortness of breath. ? Nausea or vomiting. ? Pain, pressure, or a strange feeling in the back, neck, jaw, or upper belly or in one or both shoulders or arms. ? Lightheadedness or sudden weakness. ? A fast or irregular heartbeat. After you call 911, the  may tell you to chew 1 adult-strength or 2 to 4 low-dose aspirin. Wait for an ambulance. Do not try to drive yourself. Call your doctor now or seek immediate medical care if:    · You have new or worse symptoms in your arms, legs, chest, belly, or buttocks. Symptoms may include:  ? Numbness or tingling. ? Weakness. ? Pain.     · You lose bladder or bowel control.     · You have back pain and:  ? You have injured your back while lifting or doing some other activity. Call if the pain is severe, has not gone away after 1 or 2 days, and you cannot do your normal daily activities. ? You have had a back injury before that needed treatment. ? Your pain has lasted longer than 4 weeks. ? You have had weight loss you cannot explain. ? You have a fever. ? You are age 48 or older. ? You have cancer now or have had it before. Watch closely for changes in your health, and be sure to contact your doctor if you are not getting better as expected. Where can you learn more? Go to https://[employer].Org. org and sign in to your [employer] account. Enter U549 in the Langtice box to learn more about \"Back Pain, Emergency or Urgent Symptoms: Care Instructions. \"     If you do not have an account, please click on the \"Sign Up Now\" link.   Current as of: June 26, 2019               Content Version: 12.6  © 7281-8588 Healthwise, Incorporated. Care instructions adapted under license by [facility] ([facility]). If you have questions about a medical condition or this instruction, always ask your healthcare professional. [address] any warranty or liability for your use of this information.

## 2020-11-17 NOTE — PATIENT INSTRUCTIONS
Patient Education        Back Pain, Emergency or Urgent Symptoms: Care Instructions  Your Care Instructions     Many people have back pain at one time or another. In most cases, pain gets better with self-care that includes over-the-counter pain medicine, ice, heat, and exercises. Unless you have symptoms of a severe injury or heart attack, you may be able to give yourself a few days before you call a doctor. But some back problems are very serious. Do not ignore symptoms that need to be checked right away. Follow-up care is a key part of your treatment and safety. Be sure to make and go to all appointments, and call your doctor if you are having problems. It's also a good idea to know your test results and keep a list of the medicines you take. How can you care for yourself at home? · Sit or lie in positions that are most comfortable and that reduce your pain. Try one of these positions when you lie down:  ? Lie on your back with your knees bent and supported by large pillows. ? Lie on the floor with your legs on the seat of a sofa or chair. ? Lie on your side with your knees and hips bent and a pillow between your legs. ? Lie on your stomach if it does not make pain worse. · Do not sit up in bed, and avoid soft couches and twisted positions. Bed rest can help relieve pain at first, but it delays healing. Avoid bed rest after the first day. · Change positions every 30 minutes. If you must sit for long periods of time, take breaks from sitting. Get up and walk around, or lie flat. · Try using a heating pad on a low or medium setting, for 15 to 20 minutes every 2 or 3 hours. Try a warm shower in place of one session with the heating pad. You can also buy single-use heat wraps that last up to 8 hours. You can also try ice or cold packs on your back for 10 to 20 minutes at a time, several times a day.  (Put a thin cloth between the ice pack and your skin.) This reduces pain and makes it easier to be active and exercise. · Take pain medicines exactly as directed. ? If the doctor gave you a prescription medicine for pain, take it as prescribed. ? If you are not taking a prescription pain medicine, ask your doctor if you can take an over-the-counter medicine. When should you call for help? Call 911 anytime you think you may need emergency care. For example, call if:    · You are unable to move a leg at all.     · You have back pain with severe belly pain.     · You have symptoms of a heart attack. These may include:  ? Chest pain or pressure, or a strange feeling in the chest.  ? Sweating. ? Shortness of breath. ? Nausea or vomiting. ? Pain, pressure, or a strange feeling in the back, neck, jaw, or upper belly or in one or both shoulders or arms. ? Lightheadedness or sudden weakness. ? A fast or irregular heartbeat. After you call 911, the  may tell you to chew 1 adult-strength or 2 to 4 low-dose aspirin. Wait for an ambulance. Do not try to drive yourself. Call your doctor now or seek immediate medical care if:    · You have new or worse symptoms in your arms, legs, chest, belly, or buttocks. Symptoms may include:  ? Numbness or tingling. ? Weakness. ? Pain.     · You lose bladder or bowel control.     · You have back pain and:  ? You have injured your back while lifting or doing some other activity. Call if the pain is severe, has not gone away after 1 or 2 days, and you cannot do your normal daily activities. ? You have had a back injury before that needed treatment. ? Your pain has lasted longer than 4 weeks. ? You have had weight loss you cannot explain. ? You have a fever. ? You are age 48 or older. ? You have cancer now or have had it before. Watch closely for changes in your health, and be sure to contact your doctor if you are not getting better as expected. Where can you learn more? Go to https://darrian.[employer]. org and sign in to your Bug[employer]ag account.  Enter J831 in the Search Health Information box to learn more about \"Back Pain, Emergency or Urgent Symptoms: Care Instructions. \"     If you do not have an account, please click on the \"Sign Up Now\" link. Current as of: June 26, 2019               Content Version: 12.6  © 4953-1092 [employer] Incorporated. Care instructions adapted under license by [facility] ([facility]). If you have questions about a medical condition or this instruction, always ask your healthcare professional. [address] any warranty or liability for your use of this information.

## 2020-11-17 NOTE — TELEPHONE ENCOUNTER
Reason for Disposition   Patient wants to be seen    Additional Information   Negative: SEVERE back pain (e.g., excruciating, unable to do any normal activities) and not improved after pain medicine and CARE ADVICE     Ibuprofen - not helping    Answer Assessment - Initial Assessment Questions  1. MECHANISM: \"How did the injury happen? \" (Consider the possibility of domestic violence or elder abuse)      She was getting a tub from the top shelf - the tub shifted / went off balance resulting in her falling and land on the corner of a juan . 2. ONSET: \"When did the injury happen? \" (Minutes or hours ago)      This past Sunday    3. LOCATION: \"What part of the back is injured? \"      Left lower side of back    4. SEVERITY: \"Can you move the back normally? \"      Yes, but it is sore. 5. PAIN: \"Is there any pain? \" If so, ask: \"How bad is the pain? \"   (Scale 1-10; or mild, moderate, severe)      6/10 - worsens at end of her shift     6. CORD SYMPTOMS: Any weakness or numbness of the arms or legs? \"      Denies    7. SIZE: For cuts, bruises, or swelling, ask: \"How large is it? \" (e.g., inches or centimeters)      Yes, there is a scratch - about 3 inches. The evening that it happened it was red, but she has not looked at it since    8. TETANUS: For any breaks in the skin, ask: \"When was the last tetanus booster? \"      About 4 or 5 years ago    9. OTHER SYMPTOMS: \"Do you have any other symptoms? \" (e.g., abdominal pain, blood in urine)      Denies    10. PREGNANCY: \"Is there any chance you are pregnant? \" \"When was your last menstrual period? \"        no    Protocols used: BACK INJURY-ADULT-[place]    Pod 1 Cinti    Caller reports symptoms as documented above. Caller informed of disposition. Soft transfer to pre-service center [facility]) to schedule appointment as recommended. Care advice as documented. Attention Provider: Thank you for allowing me to participate in the care of your patient.   The patient was connected to triage in response to information provided to the [facility]. Please do not respond through this encounter as the response is not directed to a shared pool.

## 2020-12-16 ENCOUNTER — TELEPHONE (OUTPATIENT)
Dept: FAMILY MEDICINE CLINIC | Age: 35
End: 2020-12-16

## 2020-12-16 NOTE — TELEPHONE ENCOUNTER
Spoke w/ pt. Attempted to change password for patient. Will try to log in / if not will contact the [employer] help desk. No further questions at this time.

## 2020-12-16 NOTE — TELEPHONE ENCOUNTER
Please contact pt regarding setting up MyChart for all of her children. Pt states she was told that she would receive a message but still haven't.

## 2021-01-08 ENCOUNTER — OFFICE VISIT (OUTPATIENT)
Dept: FAMILY MEDICINE CLINIC | Age: 36
End: 2021-01-08
Payer: COMMERCIAL

## 2021-01-08 VITALS
DIASTOLIC BLOOD PRESSURE: 80 MMHG | BODY MASS INDEX: 24.48 KG/M2 | TEMPERATURE: 98 F | HEIGHT: 62 IN | SYSTOLIC BLOOD PRESSURE: 121 MMHG | WEIGHT: 133 LBS | OXYGEN SATURATION: 98 % | HEART RATE: 81 BPM

## 2021-01-08 DIAGNOSIS — G89.29 CHRONIC RIGHT-SIDED LOW BACK PAIN WITHOUT SCIATICA: ICD-10-CM

## 2021-01-08 DIAGNOSIS — M54.50 CHRONIC RIGHT-SIDED LOW BACK PAIN WITHOUT SCIATICA: ICD-10-CM

## 2021-01-08 DIAGNOSIS — Z00.00 WELL ADULT EXAM: Primary | ICD-10-CM

## 2021-01-08 PROCEDURE — G8484 FLU IMMUNIZE NO ADMIN: HCPCS | Performed by: FAMILY MEDICINE

## 2021-01-08 PROCEDURE — 99395 PREV VISIT EST AGE 18-39: CPT | Performed by: FAMILY MEDICINE

## 2021-01-08 ASSESSMENT — ENCOUNTER SYMPTOMS: RESPIRATORY NEGATIVE: 1

## 2021-01-08 NOTE — PROGRESS NOTES
2021    Priyanka Gacria (:  1985) is a 39 y.o. female, here for a preventive medicine evaluation. Patient Active Problem List   Diagnosis    Moderate episode of recurrent major depressive disorder (HCC)    Chronic right-sided low back pain without sciatica    Neuropathy of right ulnar nerve at wrist     Most of low back pain on the right side. No radiculopathy now. Review of Systems   Constitutional: Negative. Respiratory: Negative. Cardiovascular: Negative. Prior to Visit Medications    Medication Sig Taking?  Authorizing Provider   traZODone (DESYREL) 100 MG tablet Take 1 tablet by mouth daily Yes Winnie Dhaliwal MD   Loteprednol Etabonate (INVELTYS) 1 % SUSP Apply to eye daily Yes Historical Provider, MD   albuterol sulfate HFA (VENTOLIN HFA) 108 (90 Base) MCG/ACT inhaler Inhale 2 puffs into the lungs every 4-6 hours as needed for Wheezing Yes GABRIEL Olivas CNP   moxifloxacin (VIGAMOX) 0.5 % ophthalmic solution Place 1 drop into the left eye daily Yes Historical Provider, MD   sertraline (ZOLOFT) 100 MG tablet Take 1.5 tablets by mouth daily Yes Winnie Dhaliwal MD   naproxen (NAPROSYN) 500 MG tablet Take 1 tablet by mouth 2 times daily (with meals) for 10 days  Krunal Farris MD   valACYclovir (VALTREX) 1 g tablet Take one pill po bid x 5 days  Patient not taking: Reported on 2021  Paula Guidry MD   valACYclovir (VALTREX) 1 g tablet Take 1 tablet by mouth daily  Patient not taking: Reported on 2021  GABRIEL Olivas CNP        No Known Allergies    Past Medical History:   Diagnosis Date    Anxiety     Depression     Herpes simplex virus (HSV) infection     Insomnia     Mood swing        Past Surgical History:   Procedure Laterality Date     SECTION      EYE SURGERY         Social History     Socioeconomic History    Marital status: Single     Spouse name: Not on file    Number of children: Not on file    Years of education: Not on file    Highest education level: Not on file   Occupational History    Not on file   Social Needs    Financial resource strain: Not on file    Food insecurity     Worry: Not on file     Inability: Not on file    Transportation needs     Medical: Not on file     Non-medical: Not on file   Tobacco Use    Smoking status: Current Every Day Smoker     Packs/day: 0.35     Years: 20.00     Pack years: 7.00     Start date: 1/1/2000    Smokeless tobacco: Never Used   Substance and Sexual Activity    Alcohol use: Never     Frequency: Never    Drug use: Never    Sexual activity: Yes     Partners: Male   Lifestyle    Physical activity     Days per week: Not on file     Minutes per session: Not on file    Stress: Not on file   Relationships    Social connections     Talks on phone: Not on file     Gets together: Not on file     Attends Confucianist service: Not on file     Active member of club or organization: Not on file     Attends meetings of clubs or organizations: Not on file     Relationship status: Not on file    Intimate partner violence     Fear of current or ex partner: Not on file     Emotionally abused: Not on file     Physically abused: Not on file     Forced sexual activity: Not on file   Other Topics Concern    Not on file   Social History Narrative    Not on file        Family History   Problem Relation Age of Onset    Cancer Maternal Grandmother         lymphoma    Diabetes Maternal Grandmother     Diabetes Maternal Grandfather        ADVANCE DIRECTIVE: N, <no information>    Vitals:    01/08/21 1003   BP: 121/80   Pulse: 81   Temp: 98 °F (36.7 °C)   SpO2: 98%   Weight: 133 lb (60.3 kg)   Height: 5' 2\" (1.575 m)     Estimated body mass index is 24.33 kg/m² as calculated from the following:    Height as of this encounter: 5' 2\" (1.575 m). Weight as of this encounter: 133 lb (60.3 kg). Physical Exam  Constitutional:       Appearance: Normal appearance.  She is well-developed. HENT:      Head: Normocephalic. Eyes:      General: No scleral icterus. Conjunctiva/sclera: Conjunctivae normal.   Neck:      Musculoskeletal: Normal range of motion and neck supple. Thyroid: No thyromegaly. Cardiovascular:      Rate and Rhythm: Normal rate. Heart sounds: Normal heart sounds. No murmur. No gallop. Pulmonary:      Effort: Pulmonary effort is normal.      Breath sounds: Normal breath sounds. No wheezing. Abdominal:      General: There is no distension. Palpations: Abdomen is soft. There is no hepatomegaly or splenomegaly. Tenderness: There is no abdominal tenderness. Lymphadenopathy:      Head:      Right side of head: No submandibular adenopathy. Left side of head: No submandibular adenopathy. Cervical: No cervical adenopathy. Upper Body:      Right upper body: No supraclavicular adenopathy. Left upper body: No supraclavicular adenopathy. Skin:     General: Skin is warm and dry. Nails: There is no clubbing. Neurological:      Mental Status: She is alert and oriented to person, place, and time. Cranial Nerves: No cranial nerve deficit. Deep Tendon Reflexes:      Reflex Scores:       Bicep reflexes are 2+ on the right side and 2+ on the left side. Patellar reflexes are 2+ on the right side and 2+ on the left side. Psychiatric:         Behavior: Behavior normal.         No flowsheet data found. No results found for: CHOL, CHOLFAST, TRIG, TRIGLYCFAST, HDL, LDLCHOLESTEROL, LDLCALC, GLUF, GLUCOSE, LABA1C    The ASCVD Risk score (Elder Benavidez., et al., 2013) failed to calculate for the following reasons: The 2013 ASCVD risk score is only valid for ages 36 to 78      There is no immunization history on file for this patient.     Health Maintenance   Topic Date Due    Hepatitis C screen  1985    Varicella vaccine (1 of 2 - 2-dose childhood series) 01/04/1985    Pneumococcal 0-64 years Vaccine (1 of 1 - PPSV23) 01/04/1990    HIV screen  01/04/1999    DTaP/Tdap/Td vaccine (1 - Tdap) 01/04/2003    Flu vaccine (1) 09/01/2020    Cervical cancer screen  08/27/2023    Hepatitis A vaccine  Aged Out    Hepatitis B vaccine  Aged Out    Hib vaccine  Aged Out    Meningococcal (ACWY) vaccine  Aged Out       ASSESSMENT/PLAN:  1. Well adult exam  -     Lipid Panel; Future  -     BASIC METABOLIC PANEL; Future  -     HEPATITIS C ANTIBODY; Future  Fasting bloodwork ordered  2. Chronic right-sided low back pain without sciatica  -     [facility] PT - [facility]    Refused flu shot and pcv 23. Thinks Tdap was 3-4 years ago. No follow-ups on file. An electronic signature was used to authenticate this note.     --Yvonne Marcum MD on 1/8/2021 at 10:15 AM

## 2021-01-11 DIAGNOSIS — Z00.00 WELL ADULT EXAM: ICD-10-CM

## 2021-01-11 LAB
ANION GAP SERPL CALCULATED.3IONS-SCNC: 10 MMOL/L (ref 3–16)
BUN BLDV-MCNC: 10 MG/DL (ref 7–20)
CALCIUM SERPL-MCNC: 8.9 MG/DL (ref 8.3–10.6)
CHLORIDE BLD-SCNC: 101 MMOL/L (ref 99–110)
CHOLESTEROL, TOTAL: 165 MG/DL (ref 0–199)
CO2: 26 MMOL/L (ref 21–32)
CREAT SERPL-MCNC: <0.5 MG/DL (ref 0.6–1.1)
GFR AFRICAN AMERICAN: >60
GFR NON-AFRICAN AMERICAN: >60
GLUCOSE BLD-MCNC: 89 MG/DL (ref 70–99)
HDLC SERPL-MCNC: 86 MG/DL (ref 40–60)
HEPATITIS C ANTIBODY INTERPRETATION: NORMAL
LDL CHOLESTEROL CALCULATED: 68 MG/DL
POTASSIUM SERPL-SCNC: 4.1 MMOL/L (ref 3.5–5.1)
SODIUM BLD-SCNC: 137 MMOL/L (ref 136–145)
TRIGL SERPL-MCNC: 56 MG/DL (ref 0–150)
VLDLC SERPL CALC-MCNC: 11 MG/DL

## 2021-01-26 RX ORDER — TRAZODONE HYDROCHLORIDE 100 MG/1
100 TABLET ORAL DAILY
Qty: 30 TABLET | Refills: 2 | Status: SHIPPED | OUTPATIENT
Start: 2021-01-26 | End: 2021-04-09

## 2021-02-01 ENCOUNTER — HOSPITAL ENCOUNTER (OUTPATIENT)
Dept: PHYSICAL THERAPY | Age: 36
Setting detail: THERAPIES SERIES
Discharge: HOME | End: 2021-02-01
Payer: COMMERCIAL

## 2021-02-01 NOTE — FLOWSHEET NOTE
Physical Therapy  Cancellation/No-show Note  Patient Name:  Lukas Oden  :  1985   Date:  2021  Cancelled visits to date: 0  No-shows to date: 01    For today's appointment patient:  []  Cancelled  []  Rescheduled appointment  [x]  No-show     Reason given by patient:  []  Patient ill  []  Conflicting appointment  []  No transportation    []  Conflict with work  [x]  No reason given  []  Other:     Comments:      Electronically signed by:  Vane Trent PT, DPT

## 2021-02-03 ENCOUNTER — HOSPITAL ENCOUNTER (OUTPATIENT)
Dept: PHYSICAL THERAPY | Age: 36
Setting detail: THERAPIES SERIES
Discharge: HOME | End: 2021-02-03
Payer: COMMERCIAL

## 2021-02-03 PROCEDURE — 97110 THERAPEUTIC EXERCISES: CPT

## 2021-02-03 PROCEDURE — 97161 PT EVAL LOW COMPLEX 20 MIN: CPT

## 2021-02-03 PROCEDURE — 97140 MANUAL THERAPY 1/> REGIONS: CPT

## 2021-02-03 NOTE — PLAN OF CARE
The [address] [place] [address] and [address]  Phone [phone]  Fax [phone]      Physical Therapy Certification    Dear Referring Practitioner: Nicki Chambers MD,    We had the pleasure of evaluating the following patient for physical therapy services at [address] [place] [address]. A summary of our findings can be found in the initial assessment below. This includes our plan of care. If you have any questions or concerns regarding these findings, please do not hesitate to contact me at the office phone number checked above. Thank you for the referral.       Physician Signature:_______________________________Date:__________________  By signing above (or electronic signature), therapists plan is approved by physician      Patient: Sly Michelle   : 1985   MRN: [id number]  Referring Physician: Referring Practitioner: Nicki Chambers MD      Evaluation Date: 2/3/2021      Medical Diagnosis Information:  Diagnosis: M54.5, G89.29 (ICD-10-CM) - Chronic right-sided low back pain without sciatica   Treatment Diagnosis: M54.5 Lumbar pain                                         Insurance information: PT Insurance Information: [employer]     Precautions/ Contra-indications: N/A      Latex Allergy:  []NO      []YES  Preferred Language for Healthcare:   [x]English       []other:    SUBJECTIVE: On 11/15/2020 pt was at work and reaching for a tub of produce and fell backward and hit her low back on a juan. Pt did not go to the ER and has been taking OTC ibuprofen w/o any relief. Pt denies head trauma with the event. She has had constant low back pain since this trauma. Sometimes describes pain as muscle spasm as well as getting \"stuck/locked up\" when bending forward and in certain positions. Pt denies any numbness/tingling sensations or any pain going down her legs.  Pt's goal of therapy is to be able to sit without back pain and being able to play with her kids on the floor again. Relevant Medical History: Significant anxiety/depression & is on trazodone and zoloft medications    C-SSRS Triggered by Intake questionnaire (Past 2 wk assessment):    [x] No, Questionnaire did not trigger screening.   [] Yes, Patient intake triggered further evaluation      [] C-SSRS Screening completed  [] PCP notified via Plan of Care  [] Emergency services notified     Functional Disability Index: [place] 22% deficit 2/3/21    Pain Scale: 4/10 describes it as \"stuck\" sometimes to the point of being unbearable   Easing factors: OTC ibuprofen, icy hot w/o relief, standing, & moving around   Provocative factors: sitting, crossing legs, sitting on floor, kneeling to bend down, prolonged positions    Type: [x]Constant           []Intermittent      []Radiating         [x]Localized         []other:                Numbness/Tingling: Pt denies. Occupation/School: Works in [employer]; constantly stocking, lifting, pulling, & on knees.  Currently working right now     Living Status/Prior Level of Function: Independent with ADLs and IADLs,      OBJECTIVE:   ROM   Comments   Trunk flexion 65  Repeated flex uncomfortbale    Trunk extension 5 Pan felt in L lumbar spine   Trunk R sidebend 19  Pain felt in L lumbar spine     Trunk L sidebend 19    Trunk R rotation WNL pain on R lumbar spine     Trunk L rotation WNL pain on R lumbar spine     HS flexibility                        Strength Left Right Comments   Hip flexion(L2) 4- pain on L lumbar spine  4-    Knee extension(L3) 4+ 4+     Knee flexion(S1-2) 4+ Pain felt in R lumbar spine 4+     Ankle dorsiflexion(L4) 5 5    Ankle eversion 5 5    Hip abd   4-  4-        Special tests   Comments   SLR Neg bilat      Slump test Neg     Extension rotation R pos for pain on R lumbar spine   L neg     Compression  Neg     Pelvic symmetry  R up slip         DTRs Left Right Comments   Patellar(L3-L4) Achilles(S1-S2)              N/T due to no neurological s/s 2/3      Squat: Excessive bilat forward knee flexion, depth WNL, bilat ER, pt stated left low back felt a pulling sensation     Joint mobility:               []Normal               [x]Hypo L2-L5 on R mild restriction with P/A mobility assessment                []Hyper     Palpation: Tender to palpation on bilat PSIS, paraspinals on R, and R iliac crest      Functional Mobility/Transfers: Independent      Posture: In sitting, forward head, rounded, shoulders, slight lean to R, mild elevation of R iliac crest in prone      Bandages/Dressings/Incisions: N/A     Gait: (include devices/WB status) Bilat ER, mild forward trunk lean                           [x] Patient history, allergies, meds reviewed. Medical chart reviewed. See intake form. Review Of Systems (ROS):  [x]Performed Review of systems (Integumentary, CardioPulmonary, Neurological) by intake and observation. Intake form has been scanned into medical record. Patient has been instructed to contact their primary care physician regarding ROS issues if not already being addressed at this time.        Co-morbidities/Complexities (which will affect course of rehabilitation):   []None              Arthritic conditions   []Rheumatoid arthritis (M05.9)  []Osteoarthritis (M19.91)    Cardiovascular conditions   []Hypertension (I10)  []Hyperlipidemia (E78.5)  []Angina pectoris (I20)  []Atherosclerosis (I70)    Musculoskeletal conditions   []Disc pathology   []Congenital spine pathologies   []Prior surgical intervention  []Osteoporosis (M81.8)  []Osteopenia (M85.8)   Endocrine conditions   []Hypothyroid (E03.9)  []Hyperthyroid Gastrointestinal conditions   []Constipation (U15.12)    Metabolic conditions   []Morbid obesity (E66.01)  []Diabetes type 1(E10.65) or 2 (E11.65)   []Neuropathy (G60.9)      Pulmonary conditions   []Asthma (J45)  []Coughing   []COPD (J44.9)    Psychological Disorders  [x]Anxiety (F41.9)  [x]Depression (F32.9)   []Other:    []Other:            Barriers to/and or personal factors that will affect rehab potential:              []Age  []Sex              []Motivation/Lack of Motivation                        []Co-Morbidities              []Cognitive Function, education/learning barriers              []Environmental, home barriers              [x]profession/work barriers  []past PT/medical experience  []other:  Justification: Pt's work requirements may be a potential barrier to treatment. Falls Risk Assessment (30 days):   [x] Falls Risk assessed and no intervention required.   [] Falls Risk assessed and Patient requires intervention due to being higher risk   TUG score (>12s at risk):     [] Falls education provided, including        ASSESSMENT:   Functional Impairments:                [x]Noted lumbar hypomobility              []Noted lumbosacral and/or generalized hypermobility              [x]Decreased Lumbosacral functional ROM              []Decreased core/proximal hip strength and neuromuscular control               [x]Decreased LE functional strength               []Abnormal reflexes/sensation/myotomal/dermatomal deficits  []Reduced balance/proprioceptive control               []other:       Functional Activity Limitations (from functional questionnaire and intake)              [x]Reduced ability to tolerate prolonged functional positions              []Reduced ability or difficulty with changes of positions or transfers between positions              [x]Reduced ability to maintain good posture and demonstrate good body mechanics with sitting, bending, and lifting              [x]Reduced ability to sleep              [x] Reduced ability or tolerance with driving and/or computer work              [x]Reduced ability to perform lifting, reaching, carrying tasks              [x]Reduced ability to squat              [x]Reduced ability to forward bend              []Reduced ability to ambulate prolonged functional periods/distances/surfaces              [x]Reduced ability to ascend/descend stairs              []other:       Participation Restrictions              [x]Reduced participation in self care activities              [x]Reduced participation in home management activities              [x]Reduced participation in work activities              [x]Reduced participation in social activities. [x]Reduced participation in sport/recreational activities. Classification:              [x]Signs/symptoms consistent with Lumbar stabilization subgroup. []Signs/symptoms consistent with Lumbar mobilization/manipulation subgroup, myotomes and dermatomes intact. Meets manipulation criteria. []Signs/symptoms consistent with Lumbar direction specific/centralization subgroup              []Signs/symptoms consistent with Lumbar traction subgroup                            [x]Signs/symptoms consistent with lumbar facet dysfunction              []Signs/symptoms consistent with lumbar stenosis type dysfunction              []Signs/symptoms consistent with nerve root involvement including myotome & dermatome dysfunction              []Signs/symptoms consistent with post-surgical status including: decreased ROM, strength and function.               []signs/symptoms consistent with pathology which may benefit from Dry needling                []other:       Prognosis/Rehab Potential:                                       []Excellent              [x]Good                 []Fair              []Poor     Tolerance of evaluation/treatment:               []Excellent              [x]Good                 []Fair              []Poor     Physical Therapy Evaluation Complexity Justification  [x] A history of present problem with:  [] no personal factors and/or comorbidities that impact the plan of care;  [x]1-2 personal factors and/or comorbidities that impact the plan of care  []3 personal factors and/or comorbidities that impact the plan of care  [x] An examination of body systems using standardized tests and measures addressing any of the following: body structures and functions (impairments), activity limitations, and/or participation restrictions;:  [x] a total of 1-2 or more elements   [] a total of 3 or more elements   [] a total of 4 or more elements   [x] A clinical presentation with:  [x] stable and/or uncomplicated characteristics   [] evolving clinical presentation with changing characteristics  [] unstable and unpredictable characteristics;   [x] Clinical decision making of [] low, [] moderate, [] high complexity using standardized patient assessment instrument and/or measurable assessment of functional outcome. [x] EVAL (LOW) 97128 (typically 20 minutes face-to-face)  [] EVAL (MOD) 35062 (typically 30 minutes face-to-face)  [] EVAL (HIGH) 24139 (typically 45 minutes face-to-face)  [] RE-EVAL            PLAN:      Frequency/Duration:  2 days per week for 4 Weeks:  Interventions:  [x]  Therapeutic exercise including: strength training, ROM, for LE, Glutes and core   [x]  NMR activation and proprioception for glutes , LE and Core   [x]  Manual therapy as indicated for Hip complex, LE and spine to include: Dry Needling/IASTM, STM, PROM, Gr I-IV mobilizations, manipulation. [x]  Modalities as needed that may include: thermal agents, E-stim, Biofeedback, US, iontophoresis as indicated  [x]  Patient education on joint protection, postural re-education, activity modification, progression of HEP. HEP instruction:  Access Code: [email or web address] Fields: [email or web address]   Date: 02/03/2021   Prepared by: Kylie Ferreira     Exercises   Supine Hamstring Stretch - 10 reps - 1 sets - 10 hold - 1-2x daily - 7x weekly   Supine Double Knee to Chest - 10 reps - 1 sets - 10 hold - 1-2x daily - 7x weekly   Supine Posterior Pelvic Tilt - 10 reps - 1 sets - 10 hold - 1-2x daily - 7x weekly   Hooklying Lumbar Rotation - 10 reps - 3 sets - 1-2x daily - 7x weekly   Sidelying Thoracic Rotation - 10 reps - 1 sets - 10 hold - 1-2x daily - 7x weekly        GOALS:   Patient stated goal: To be able to sit without back pain and being able to play with her kids on the floor again. [] Progressing: [] Met: [] Not Met: [] Adjusted    Therapist goals for Patient:   Short Term Goals: To be achieved in: 2 weeks  1. Independent in HEP and progression per patient tolerance, in order to prevent re-injury. [] Progressing: [] Met: [] Not Met: [] Adjusted   2. Patient will have a decrease in pain to facilitate improvement in movement, function, and ADLs as indicated by Functional Deficits. [] Progressing: [] Met: [] Not Met: [] Adjusted    Long Term Goals: To be achieved in: 4 weeks  1. Disability index score of 10% or less for the [place] to assist with reaching prior level of function. [] Progressing: [] Met: [] Not Met: [] Adjusted  2. Patient will demonstrate increased LS AROM mobility to WNL to allow for proper joint functioning as indicated by patients Functional Deficits. [] Progressing: [] Met: [] Not Met: [] Adjusted  3. Patient will demonstrate an increase in Strength of 4+/5 of BLE in order to allow for proper functional mobility as indicated by patients Functional Deficits. [] Progressing: [] Met: [] Not Met: [] Adjusted  4. Patient will return to all functional activities without increased symptoms or restriction. [] Progressing: [] Met: [] Not Met: [] Adjusted  5. Pt will be able to sit for one hour without increased symptoms or restriction. [] Progressing: [] Met: [] Not Met: [] Adjusted       Electronically signed by: Keri Segundo, PT  Jyoti Betancourt, Student Physical Therapist  Therapist was present, directed the patient's care, made skilled judgement, and was responsible for assessment and treatment of the patient.

## 2021-02-03 NOTE — FLOWSHEET NOTE
[facility] 20, [address] [place] [address], [address] [place] [address]  Phone: [phone]   Fax:     [phone]    Physical Therapy Daily Treatment Note  Date:  2/3/2021    Patient Name:  Jesi Landerso    :  1985  MRN: [id number]  Restrictions/Precautions:    Medical/Treatment Diagnosis Information:  Diagnosis: M54.5, G89.29 (ICD-10-CM) - Chronic right-sided low back pain without sciatica  Treatment Diagnosis: M54.5 Lumbar pain  Insurance/Certification information:  PT Insurance Information: [facility] Plan  Physician Information:  Referring Practitioner: Greg Smallwood MD  Has the plan of care been signed (Y/N):        []  Yes  [x]  No     Date of Patient follow up with Physician: Not scheduled yet      Is this a Progress Report:     []  Yes  [x]  No        If Yes:  Date Range for reporting period:  Beginnin/3/2021  Ending    Progress report will be due (10 Rx or 30 days whichever is less): 211      Recertification will be due (POC Duration  / 90 days whichever is less): 2021        Visit # Insurance Allowable Auth Required   1  2/3 [facility]  30 visits []  Yes [x]  No        Functional Scale: [place] 22% deficit Date assessed:  2/3/2021     Latex Allergy:  []NO      []YES  Preferred Language for Healthcare:   [x]English       []other      Pain level:  4/10 describes it as \"stuck\" sometimes to the point of being unbearable      SUBJECTIVE:  See eval    OBJECTIVE: See eval   Observation:    Test measurements:      RESTRICTIONS/PRECAUTIONS: N/A    Exercises/Interventions:   ROM/stretches     Hamstring stretch  10\"hx5 supine   Start 2/3   SKTC     DKTC 10\"hx5 Supine  Start 2/3   Prayer stretch     Supine HS     Pelvic tilt + TA 10\"hx5 Start 2/3 Cues for proper form   Hook lying rotation 10\"hx5 Start 2/3   Sidely thoracic mobilization  10\"hx5 sidely L  Start 2/3   Cat and camel          Strengthening     SLR Quadruped alternate UE reaches     Quadruped alternate LE reaches     Quadruped alternate UE/LE reaches     [employer] heel raises     Sit ups      planks     Tband lat pulls     Tband rows         Manual Intervention      R LE LAD Supine belt around R ankle 10\"x5   Start 2/3   Up slip manipulation R  Start 2/3   Prone PA Prone L2-L5 on R P/A grade 1-3 x4'  Start 2/3    GISTM/STM     Lumbar Manip     SI Manip     Hip belt mobs     Hip LA distraction            Therapeutic Exercise and NMR EXR  [x] (08377) Provided verbal/tactile cueing for activities related to strengthening, flexibility, endurance, ROM  for improvements in proximal hip and core control with self care, mobility, lifting and ambulation.  [] (49515) Provided verbal/tactile cueing for activities related to improving balance, coordination, kinesthetic sense, posture, motor skill, proprioception  to assist with core control in self care, mobility, lifting, and ambulation.      Therapeutic Activities:    [] (95815 or 79490) Provided verbal/tactile cueing for activities related to improving balance, coordination, kinesthetic sense, posture, motor skill, proprioception and motor activation to allow for proper function  with self care and ADLs  [] (76916) Provided training and instruction to the patient for proper core and proximal hip recruitment and positioning with ambulation re-education     Home Exercise Program:    [x] (36432) Reviewed/Progressed HEP activities related to strengthening, flexibility, endurance, ROM of core, proximal hip and LE for functional self-care, mobility, lifting and ambulation   [] (04135) Reviewed/Progressed HEP activities related to improving balance, coordination, kinesthetic sense, posture, motor skill, proprioception of core, proximal hip and LE for self care, mobility, lifting, and ambulation      Manual Treatments:  PROM / STM / Oscillations-Mobs:  G-I, II, III, IV (PA's, Inf., Post.)  [] (06243) Provided manual therapy to mobilize proximal hip and LS spine soft tissue/joints for the purpose of modulating pain, promoting relaxation,  increasing ROM, reducing/eliminating soft tissue swelling/inflammation/restriction, improving soft tissue extensibility and allowing for proper ROM for normal function with self care, mobility, lifting and ambulation. Modalities:    2/3 N/A    Charges:  Timed Code Treatment Minutes: 24'   Total Treatment Minutes: 95:66-53:92  46'       [x] EVAL (LOW) 48905 (typically 20 minutes face-to-face)  [] EVAL (MOD) 85864 (typically 30 minutes face-to-face)  [] EVAL (HIGH) 09782 (typically 45 minutes face-to-face)  [] RE-EVAL     [x] KM(06754) x  1   [] IONTO  [] NMR (27583) x     [] VASO  [x] Manual (65111) x  1    [] Other:  [] TA x      [] Mech Traction (62326)  [] ES(attended) (70464)      [] ES (un) (68531):     Goals:   Patient stated goal: To be able to sit without back pain and being able to play with her kids on the floor again. [] Progressing: [] Met: [] Not Met: [] Adjusted    Therapist goals for Patient:   Short Term Goals: To be achieved in: 2 weeks  1. Independent in HEP and progression per patient tolerance, in order to prevent re-injury. [] Progressing: [] Met: [] Not Met: [] Adjusted   2. Patient will have a decrease in pain to facilitate improvement in movement, function, and ADLs as indicated by Functional Deficits. [] Progressing: [] Met: [] Not Met: [] Adjusted    Long Term Goals: To be achieved in: 4 weeks  1. Disability index score of 10% or less for the [place] to assist with reaching prior level of function. [] Progressing: [] Met: [] Not Met: [] Adjusted  2. Patient will demonstrate increased LS AROM mobility to WNL to allow for proper joint functioning as indicated by patients Functional Deficits. [] Progressing: [] Met: [] Not Met: [] Adjusted  3.  Patient will demonstrate an increase in Strength of 4+/5 of BLE in order to allow for proper functional mobility as indicated by patients Functional Deficits. [] Progressing: [] Met: [] Not Met: [] Adjusted  4. Patient will return to all functional activities without increased symptoms or restriction. [] Progressing: [] Met: [] Not Met: [] Adjusted  5. Pt will be able to sit for one hour without increased symptoms or restriction. [] Progressing: [] Met: [] Not Met: [] Adjusted     Overall Progression Towards Functional goals/ Treatment Progress Update:  [] Patient is progressing as expected towards functional goals listed. [] Progression is slowed due to complexities/Impairments listed. [] Progression has been slowed due to co-morbidities. [x] Plan just implemented, too soon to assess goals progression <30days   [] Goals require adjustment due to lack of progress  [] Patient is not progressing as expected and requires additional follow up with physician  [] Other    Prognosis for POC: [x] Good [] Fair  [] Poor      Patient requires continued skilled intervention: [x] Yes  [] No    Treatment/Activity Tolerance:  [] Patient able to complete treatment  [] Patient limited by fatigue  [] Patient limited by pain     [] Patient limited by other medical complications  [] Other:   2/3:  When performing hamstring stretch seated EOT, pt eventually felt a dull ache in low back. Pt then performed supine hamstring stretch without an increase in symptoms. Pt required cueing in order to properly perform posterior pelvic tilt with TA activation. Pt stated the DKTC, hookyling rotation, and sidely thoracic mobilization felt good. Pt stated she felt good after today's session. Patient education:  2/3: Pt educated on deficits, anatomy of lumbar spine and pelvis, POC, HEP and importance of compliance, and proper body mechanics while lifting, pushing, pulling, and carrying objects at work.      Prognosis: [] Good [] Fair  [] Poor    Patient Requires Follow-up: [x] Yes  [] No    PLAN: See eval  [] Continue per plan of care [] Alter current plan (see comments)  [x] Plan of care initiated [] Hold pending MD visit [] Discharge  2/3: Plan to see pt 2x/week for 4 weeks in order to increase LS ROM, strength, and flexibility in order to decrease pain and increase function. Electronically signed by: Jana Bahena PT, DPT  Mingo Vora, Student Physical Therapist  Therapist was present, directed the patient's care, made skilled judgement, and was responsible for assessment and treatment of the patient. *If patient does not return for further follow ups after this date. Please consider this as the patients discharge from physical therapy.

## 2021-02-11 ENCOUNTER — HOSPITAL ENCOUNTER (OUTPATIENT)
Dept: PHYSICAL THERAPY | Age: 36
Setting detail: THERAPIES SERIES
Discharge: HOME | End: 2021-02-11
Payer: COMMERCIAL

## 2021-02-11 PROCEDURE — 97140 MANUAL THERAPY 1/> REGIONS: CPT

## 2021-02-11 PROCEDURE — 97110 THERAPEUTIC EXERCISES: CPT

## 2021-02-11 PROCEDURE — 97112 NEUROMUSCULAR REEDUCATION: CPT

## 2021-02-11 NOTE — FLOWSHEET NOTE
The [address] [place] [address],[address], [address] [facility], [address] [place] [address]  Phone: [phone]   Fax:     [phone]    Physical Therapy Daily Treatment Note  Date:  2021    Patient Name:  Jose Cameron    :  1985  MRN: [id number]  Restrictions/Precautions:    Medical/Treatment Diagnosis Information:  Diagnosis: M54.5, G89.29 (ICD-10-CM) - Chronic right-sided low back pain without sciatica  Treatment Diagnosis: M54.5 Lumbar pain  Insurance/Certification information:  PT Insurance Information: [facility] Plan  Physician Information:  Referring Practitioner: Kim Hunter MD  Has the plan of care been signed (Y/N):        []  Yes  [x]  No     Date of Patient follow up with Physician: Not scheduled yet      Is this a Progress Report:     []  Yes  [x]  No        If Yes:  Date Range for reporting period:  Beginnin/3/2021  Ending    Progress report will be due (10 Rx or 30 days whichever is less):       Recertification will be due (POC Duration  / 90 days whichever is less): 2021        Visit # Insurance Allowable Auth Required   2   [facility]  30 visits []  Yes [x]  No        Functional Scale: [place] 22% deficit Date assessed:  2/3/2021     Latex Allergy:  []NO      []YES  Preferred Language for Healthcare:   [x]English       []other      Pain level:  : 6-710      SUBJECTIVE:  : Pt states driving here she experienced an increase of symptoms. She describes her symptoms overall as getting stuck, a little bit of a cramp, pinch, and achy. She states she hasn't been as consistent with HEP due to time. She reports bending over to tie her shoes is very difficult, and it has been interfering with her daily activities more. She states since she left after her intial eval, she felt good and performed the HEP a few times but after that she states they are not helping as much.  She states overall she is getting worse, the exercises are a little relieving but not long lasting, and her activity level has not changed. OBJECTIVE: See eval   Observation:    Test measurements:      RESTRICTIONS/PRECAUTIONS: N/A    Exercises/Interventions:   ROM/stretches     Hamstring stretch  20\"hx5 supine bilat  ^2/11   SKTC     DKTC 20\"hx5 Supine  ^2/11   Prayer stretch     Supine HS     Pelvic tilt + TA  2/11 progressed to TA supine march    Lateral trunk rotation on SB 3x10  ^2/11   Sidely thoracic mobilization  20\"hx5 sidely L  ^2/11   Cat and camel          Strengthening     SLR     TA supine march  3x10  Start 2/11 cues for proper form   TA supine UE alt overhead 3x10  Start 2/11   Glute sets + TA  10\"hx10  Start 2/11 cues to decrease lumbar compensation    Seated marches on Blue SB 3x10  Start 2/11   Quadruped alternate UE reaches     Quadruped alternate LE reaches     Quadruped alternate UE/LE reaches     [employer] heel raises     Sit ups      planks     Tband lat pulls     Tband rows         Manual Intervention      R LE LAD   Start 2/3   STM  Lumbar parapsinals L2-5 R x7'  2/11   Prone PA Prone L2-L5 on R P/A grade 1-3 x6'  2/11   GISTM/STM     Lumbar Manip     SI Manip     Hip belt mobs     Hip LA distraction            Therapeutic Exercise and NMR EXR  [x] (81391) Provided verbal/tactile cueing for activities related to strengthening, flexibility, endurance, ROM  for improvements in proximal hip and core control with self care, mobility, lifting and ambulation. [x] (89322) Provided verbal/tactile cueing for activities related to improving balance, coordination, kinesthetic sense, posture, motor skill, proprioception  to assist with core control in self care, mobility, lifting, and ambulation.      Therapeutic Activities:    [] (84388 or 55134) Provided verbal/tactile cueing for activities related to improving balance, coordination, kinesthetic sense, posture, motor skill, proprioception and motor activation prevent re-injury. [] Progressing: [] Met: [] Not Met: [] Adjusted   2. Patient will have a decrease in pain to facilitate improvement in movement, function, and ADLs as indicated by Functional Deficits. [] Progressing: [] Met: [] Not Met: [] Adjusted    Long Term Goals: To be achieved in: 4 weeks  1. Disability index score of 10% or less for the [place] to assist with reaching prior level of function. [] Progressing: [] Met: [] Not Met: [] Adjusted  2. Patient will demonstrate increased LS AROM mobility to WNL to allow for proper joint functioning as indicated by patients Functional Deficits. [] Progressing: [] Met: [] Not Met: [] Adjusted  3. Patient will demonstrate an increase in Strength of 4+/5 of BLE in order to allow for proper functional mobility as indicated by patients Functional Deficits. [] Progressing: [] Met: [] Not Met: [] Adjusted  4. Patient will return to all functional activities without increased symptoms or restriction. [] Progressing: [] Met: [] Not Met: [] Adjusted  5. Pt will be able to sit for one hour without increased symptoms or restriction. [] Progressing: [] Met: [] Not Met: [] Adjusted     Overall Progression Towards Functional goals/ Treatment Progress Update:  [] Patient is progressing as expected towards functional goals listed. [] Progression is slowed due to complexities/Impairments listed. [] Progression has been slowed due to co-morbidities.   [x] Plan just implemented, too soon to assess goals progression <30days   [] Goals require adjustment due to lack of progress  [] Patient is not progressing as expected and requires additional follow up with physician  [] Other    Prognosis for POC: [x] Good [] Fair  [] Poor      Patient requires continued skilled intervention: [x] Yes  [] No    Treatment/Activity Tolerance:  [] Patient able to complete treatment  [] Patient limited by fatigue  [] Patient limited by pain     [] Patient limited by other medical complications  [] Other:   2/11: Pt responded well to manual interventions due to decreased muscle guarding & pain and increased lumbar joint mobility. Pt required multiple cues for glute sets and TA marches in order to properly activate the TA and glute. Pt fatigued with seated marches on SB. Pt stated she felt a lot better at the end of today's session. Patient education:  2/11: Educated pt on body mechanics while at work, & consistency of HEP. Updated HEP  2/3: Pt educated on deficits, anatomy of lumbar spine and pelvis, POC, HEP and importance of compliance, and proper body mechanics while lifting, pushing, pulling, and carrying objects at work. Prognosis: [] Good [] Fair  [] Poor    Patient Requires Follow-up: [x] Yes  [] No    PLAN: See eval  [] Continue per plan of care [] Alter current plan (see comments)  [x] Plan of care initiated [] Hold pending MD visit [] Discharge  2/11: Continue to progress TA stabilization exercises. 2/3: Plan to see pt 2x/week for 4 weeks in order to increase LS ROM, strength, and flexibility in order to decrease pain and increase function. Electronically signed by: Jose Luis Kemp PT, DPT  Jamal Bryant, Student Physical Therapist  Therapist was present, directed the patient's care, made skilled judgement, and was responsible for assessment and treatment of the patient. *If patient does not return for further follow ups after this date. Please consider this as the patients discharge from physical therapy.

## 2021-02-16 ENCOUNTER — APPOINTMENT (OUTPATIENT)
Dept: PHYSICAL THERAPY | Age: 36
End: 2021-02-16
Payer: COMMERCIAL

## 2021-02-18 ENCOUNTER — APPOINTMENT (OUTPATIENT)
Dept: PHYSICAL THERAPY | Age: 36
End: 2021-02-18
Payer: COMMERCIAL

## 2021-02-23 ENCOUNTER — HOSPITAL ENCOUNTER (OUTPATIENT)
Dept: PHYSICAL THERAPY | Age: 36
Setting detail: THERAPIES SERIES
Discharge: HOME | End: 2021-02-23
Payer: COMMERCIAL

## 2021-02-23 ENCOUNTER — APPOINTMENT (OUTPATIENT)
Dept: PHYSICAL THERAPY | Age: 36
End: 2021-02-23
Payer: COMMERCIAL

## 2021-02-23 PROCEDURE — 97112 NEUROMUSCULAR REEDUCATION: CPT

## 2021-02-23 PROCEDURE — 97110 THERAPEUTIC EXERCISES: CPT

## 2021-02-23 PROCEDURE — 97140 MANUAL THERAPY 1/> REGIONS: CPT

## 2021-02-23 NOTE — FLOWSHEET NOTE
The [address] [place] [address],[address], [address], [address] [place] [address]  Phone: [phone]   Fax:     [phone]    Physical Therapy Daily Treatment Note  Date:  2021    Patient Name:  Geeta Reyna    :  1985  MRN: [id number]  Restrictions/Precautions:    Medical/Treatment Diagnosis Information:  Diagnosis: M54.5, G89.29 (ICD-10-CM) - Chronic right-sided low back pain without sciatica  Treatment Diagnosis: M54.5 Lumbar pain  Insurance/Certification information:  PT Insurance Information: [facility] Plan  Physician Information:  Referring Practitioner: Winnie Dhailwal MD  Has the plan of care been signed (Y/N):        [x]  Yes  []  No     Date of Patient follow up with Physician: Not scheduled yet      Is this a Progress Report:     []  Yes  [x]  No        If Yes:  Date Range for reporting period:  Beginnin/3/2021  Ending    Progress report will be due (10 Rx or 30 days whichever is less):       Recertification will be due (POC Duration  / 90 days whichever is less): 2021        Visit # Insurance Allowable Auth Required   3   [facility]  30 visits []  Yes [x]  No        Functional Scale: [place] 22% deficit Date assessed:  2/3/2021     Latex Allergy:  []NO      []YES  Preferred Language for Healthcare:   [x]English       []other      Pain level:  : 4/10      SUBJECTIVE:   Pt states symptoms are not as severe but still located in R low back. Has been busy and therefore not consistent with HEP. 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    OBJECTIVE: See eval   Observation:    Test measurements:      RESTRICTIONS/PRECAUTIONS: N/A    Exercises/Interventions:   ROM/stretches     Hamstring stretch  30\"hx3 supine bilat  ^   SKTC     DKTC   withheld d/t decrease stretch reported    Child's pose 10\"hx10    Supine HS     Pelvic tilt + TA strengthening, flexibility, endurance, ROM of core, proximal hip and LE for functional self-care, mobility, lifting and ambulation   [] (91646) Reviewed/Progressed HEP activities related to improving balance, coordination, kinesthetic sense, posture, motor skill, proprioception of core, proximal hip and LE for self care, mobility, lifting, and ambulation      Manual Treatments:  PROM / STM / Oscillations-Mobs:  G-I, II, III, IV (PA's, Inf., Post.)  [x] (76071) Provided manual therapy to mobilize proximal hip and LS spine soft tissue/joints for the purpose of modulating pain, promoting relaxation,  increasing ROM, reducing/eliminating soft tissue swelling/inflammation/restriction, improving soft tissue extensibility and allowing for proper ROM for normal function with self care, mobility, lifting and ambulation. Modalities:    2/11 N/A    Charges:  Timed Code Treatment Minutes: 36'   Total Treatment Minutes: 3:36-4:21  45'       [] EVAL (LOW) 86082 (typically 20 minutes face-to-face)  [] EVAL (MOD) 88868 (typically 30 minutes face-to-face)  [] EVAL (HIGH) 56369 (typically 45 minutes face-to-face)  [] RE-EVAL     [x] RO(25650) x  1   [] IONTO  [x] NMR (21835) x 1    [] VASO  [x] Manual (62807) x  1    [] Other:  [] TA x      [] Mech Traction (35936)  [] ES(attended) (20774)      [] ES (un) (23884):     Goals:   Patient stated goal: To be able to sit without back pain and being able to play with her kids on the floor again. [] Progressing: [] Met: [] Not Met: [] Adjusted    Therapist goals for Patient:   Short Term Goals: To be achieved in: 2 weeks  1. Independent in HEP and progression per patient tolerance, in order to prevent re-injury. [] Progressing: [] Met: [] Not Met: [] Adjusted   2. Patient will have a decrease in pain to facilitate improvement in movement, function, and ADLs as indicated by Functional Deficits. [] Progressing: [] Met: [] Not Met: [] Adjusted    Long Term Goals:  To be achieved in: 4 weeks  1. Disability index score of 10% or less for the [place] to assist with reaching prior level of function. [] Progressing: [] Met: [] Not Met: [] Adjusted  2. Patient will demonstrate increased LS AROM mobility to WNL to allow for proper joint functioning as indicated by patients Functional Deficits. [] Progressing: [] Met: [] Not Met: [] Adjusted  3. Patient will demonstrate an increase in Strength of 4+/5 of BLE in order to allow for proper functional mobility as indicated by patients Functional Deficits. [] Progressing: [] Met: [] Not Met: [] Adjusted  4. Patient will return to all functional activities without increased symptoms or restriction. [] Progressing: [] Met: [] Not Met: [] Adjusted  5. Pt will be able to sit for one hour without increased symptoms or restriction. [] Progressing: [] Met: [] Not Met: [] Adjusted     Overall Progression Towards Functional goals/ Treatment Progress Update:  [] Patient is progressing as expected towards functional goals listed. [] Progression is slowed due to complexities/Impairments listed. [] Progression has been slowed due to co-morbidities. [x] Plan just implemented, too soon to assess goals progression <30days   [] Goals require adjustment due to lack of progress  [] Patient is not progressing as expected and requires additional follow up with physician  [] Other    Prognosis for POC: [x] Good [] Fair  [] Poor      Patient requires continued skilled intervention: [x] Yes  [] No    Treatment/Activity Tolerance:  [] Patient able to complete treatment  [] Patient limited by fatigue  [] Patient limited by pain     [] Patient limited by other medical complications  [] Other:   2/23 Mild tightness tenderness at R paraspinals and L2-L3 facets bilateral that responded well to manual therapy. Fatigued with mild increase in intensity of treatment but demonstrated appropriate abdominal activation and no symptoms reported in lumbar spine.      Patient education:  2/23 Updated HEP, stressed importance of consistency with HEP and precautions at work such as lifting heavy weights that could place increase stress on lumbar spine. 2/11: Educated pt on body mechanics while at work, & consistency of HEP. Updated HEP  2/3: Pt educated on deficits, anatomy of lumbar spine and pelvis, POC, HEP and importance of compliance, and proper body mechanics while lifting, pushing, pulling, and carrying objects at work. [address] [place] [address] code: [id number]     Prognosis: [] Good [] Fair  [] Poor    Patient Requires Follow-up: [x] Yes  [] No    PLAN: See eval  [] Continue per plan of care [] Alter current plan (see comments)  [x] Plan of care initiated [] Hold pending MD visit [] Discharge  2/23 Progress with core stabilization in more functional activities such as pallof press. 2/3: Plan to see pt 2x/week for 4 weeks in order to increase LS ROM, strength, and flexibility in order to decrease pain and increase function. Electronically signed by: Bessie Beltrán PT, DPT      *If patient does not return for further follow ups after this date. Please consider this as the patients discharge from physical therapy.

## 2021-02-23 NOTE — FLOWSHEET NOTE
The [address] [facility],[address], [address] [facility]  Phone [phone]  Fax [phone]    Injury Notification    Dear Employer:       Date: 2/23/2021    This Letter is to inform you that Priyanka Alfaro was seen by sports medicine and orthopaedic specialists at the [address] [facility]. The purpose of this letter is to provide immediate information on the diagnosis, treatment, and any activity restrictions for the above athlete. If you have any questions, please do not hesitate to call us and we will immediately put you in touch with the physician who treated this athlete. Diagnosis Information:     M54.5, G89.29 (ICD-10-CM) - Chronic right-sided low back pain without sciatica       Rehabilitation and immediate treatment program:   Priyanka has been coming to physical therapy since 2/3/21 d/t low back pain    Restrictions:  Based on patient progress and current symptoms/ limitations it is advised she not lift over 50 lbs in order to continue to promote healing at lumbar spine and decrease risk of return in symptoms. If you have any questions or concerns please email me at [email or web address] or call [phone].      Employer name:  [employer]         Physician:  Dr. Rachelle Prasad    Completed by: Stacy Fritz DPT

## 2021-02-25 ENCOUNTER — HOSPITAL ENCOUNTER (OUTPATIENT)
Dept: PHYSICAL THERAPY | Age: 36
Setting detail: THERAPIES SERIES
Discharge: HOME | End: 2021-02-25
Payer: COMMERCIAL

## 2021-02-25 PROCEDURE — 97530 THERAPEUTIC ACTIVITIES: CPT

## 2021-02-25 PROCEDURE — 97110 THERAPEUTIC EXERCISES: CPT

## 2021-02-25 PROCEDURE — 97112 NEUROMUSCULAR REEDUCATION: CPT

## 2021-02-25 NOTE — FLOWSHEET NOTE
The [address] [place] [address],[address], [address], [address] [place] [address]  Phone: [phone]   Fax:     [phone]    Physical Therapy Daily Treatment Note  Date:  2021    Patient Name:  Ayse Mcgowan    :  1985  MRN: [id number]  Restrictions/Precautions:    Medical/Treatment Diagnosis Information:  Diagnosis: M54.5, G89.29 (ICD-10-CM) - Chronic right-sided low back pain without sciatica  Treatment Diagnosis: M54.5 Lumbar pain  Insurance/Certification information:  PT Insurance Information: [facility] Plan  Physician Information:  Referring Practitioner: Yvonne Marcum MD  Has the plan of care been signed (Y/N):        [x]  Yes  []  No     Date of Patient follow up with Physician: Not scheduled yet      Is this a Progress Report:     []  Yes  [x]  No        If Yes:  Date Range for reporting period:  Beginnin/3/2021  Ending    Progress report will be due (10 Rx or 30 days whichever is less):       Recertification will be due (POC Duration  / 90 days whichever is less): 2021        Visit # Insurance Allowable Auth Required   4   [facility]  30 visits []  Yes [x]  No        Functional Scale: [place] 22% deficit Date assessed:  2/3/2021     Latex Allergy:  []NO      []YES  Preferred Language for Healthcare:   [x]English       []other      Pain level:  : 0/10      SUBJECTIVE:   Pt states she had some soreness on  following last visit but feeling better today.    OBJECTIVE: See eval   Observation:    Test measurements:      RESTRICTIONS/PRECAUTIONS: N/A    Exercises/Interventions:   ROM/stretches     Hamstring stretch  30\"hx3 supine bilat  ^   SKTC     DKTC   withheld d/t decrease stretch reported    Child's pose 10\"hx10    Supine HS     Pelvic tilt + TA   progressed to TA supine march    Lateral trunk rotation on SB  ^   Sidely thoracic mobilization  30\"hx3 sidely L  ^ Cat and camel 3x10 2/23 quadruped         Strengthening     SLR     TA supine march  3x10  ^2/23 starting position 90/90    BKFO Blue TB 10\"hx5 bilat ^2/25   TA supine UE alt overhead 3x10 2# med ball ^2/23 LE 90/90    Glute sets + TA   2/23 progressed with bridges   Bridges Blue TB 3x10  2/23 cues for proper glut activation    Seated marches on Blue SB 3x10  Start 2/11   TB Ext Green TB 3x10 bilat Start 2/25 cues in standing for proper TA activation to avoid lumbar extension   Pallof press Green TB 2x10 bilat Start 2/25                                 Postural education Educated on proper posture in standing with emphasis on TA activation and weight going through mid foot and not heels in oder to decrease stress placed on lumbar spine  2/25        Manual Intervention      R LE LAD   Start 2/3   [facility]  2/25 withheld d/t decrease in muscle guarding   Prone PA 2/25 withheld d/t decrease in muscle guarding   GISTM/STM     Lumbar Manip     SI Manip     Hip belt mobs     Hip LA distraction            Therapeutic Exercise and NMR EXR  [x] (78381) Provided verbal/tactile cueing for activities related to strengthening, flexibility, endurance, ROM  for improvements in proximal hip and core control with self care, mobility, lifting and ambulation. [x] (62603) Provided verbal/tactile cueing for activities related to improving balance, coordination, kinesthetic sense, posture, motor skill, proprioception  to assist with core control in self care, mobility, lifting, and ambulation.      Therapeutic Activities:    [] (00049 or 40463) Provided verbal/tactile cueing for activities related to improving balance, coordination, kinesthetic sense, posture, motor skill, proprioception and motor activation to allow for proper function  with self care and ADLs  [] (71644) Provided training and instruction to the patient for proper core and proximal hip recruitment and positioning with ambulation re-education     Home Exercise Program: [x] (60290) Reviewed/Progressed HEP activities related to strengthening, flexibility, endurance, ROM of core, proximal hip and LE for functional self-care, mobility, lifting and ambulation   [] (24274) Reviewed/Progressed HEP activities related to improving balance, coordination, kinesthetic sense, posture, motor skill, proprioception of core, proximal hip and LE for self care, mobility, lifting, and ambulation      Manual Treatments:  PROM / STM / Oscillations-Mobs:  G-I, II, III, IV (PA's, Inf., Post.)  [x] (26550) Provided manual therapy to mobilize proximal hip and LS spine soft tissue/joints for the purpose of modulating pain, promoting relaxation,  increasing ROM, reducing/eliminating soft tissue swelling/inflammation/restriction, improving soft tissue extensibility and allowing for proper ROM for normal function with self care, mobility, lifting and ambulation. Modalities:    2/11 N/A    Charges:  Timed Code Treatment Minutes: 40'   Total Treatment Minutes: 11:43-12:28  45'       [] EVAL (LOW) 11837 (typically 20 minutes face-to-face)  [] EVAL (MOD) 27992 (typically 30 minutes face-to-face)  [] EVAL (HIGH) 79221 (typically 45 minutes face-to-face)  [] RE-EVAL     [x] IE(28292) x  1   [] IONTO  [x] NMR (34926) x 1    [] VASO  [] Manual (30667) x      [] Other:  [x] TA x1      [] Mech Traction (30668)  [] ES(attended) (15839)      [] ES (un) (38546):     Goals:   Patient stated goal: To be able to sit without back pain and being able to play with her kids on the floor again. [] Progressing: [] Met: [] Not Met: [] Adjusted    Therapist goals for Patient:   Short Term Goals: To be achieved in: 2 weeks  1. Independent in HEP and progression per patient tolerance, in order to prevent re-injury. [] Progressing: [] Met: [] Not Met: [] Adjusted   2. Patient will have a decrease in pain to facilitate improvement in movement, function, and ADLs as indicated by Functional Deficits.     [] Progressing: [] Met: [] Not Met: [] Adjusted    Long Term Goals: To be achieved in: 4 weeks  1. Disability index score of 10% or less for the Dannyan to assist with reaching prior level of function. [] Progressing: [] Met: [] Not Met: [] Adjusted  2. Patient will demonstrate increased LS AROM mobility to WNL to allow for proper joint functioning as indicated by patients Functional Deficits. [] Progressing: [] Met: [] Not Met: [] Adjusted  3. Patient will demonstrate an increase in Strength of 4+/5 of BLE in order to allow for proper functional mobility as indicated by patients Functional Deficits. [] Progressing: [] Met: [] Not Met: [] Adjusted  4. Patient will return to all functional activities without increased symptoms or restriction. [] Progressing: [] Met: [] Not Met: [] Adjusted  5. Pt will be able to sit for one hour without increased symptoms or restriction. [] Progressing: [] Met: [] Not Met: [] Adjusted     Overall Progression Towards Functional goals/ Treatment Progress Update:  [] Patient is progressing as expected towards functional goals listed. [] Progression is slowed due to complexities/Impairments listed. [] Progression has been slowed due to co-morbidities. [x] Plan just implemented, too soon to assess goals progression <30days   [] Goals require adjustment due to lack of progress  [] Patient is not progressing as expected and requires additional follow up with physician  [] Other    Prognosis for POC: [x] Good [] Fair  [] Poor      Patient requires continued skilled intervention: [x] Yes  [] No    Treatment/Activity Tolerance:  [] Patient able to complete treatment  [] Patient limited by fatigue  [] Patient limited by pain     [] Patient limited by other medical complications  [] Other:   2/25: Decrease muscle guarding and improve in lumbar facet mobility compared to last visit. Fatigued with exercises but no c/o increase in low back reported.  Responded well to cues and exercises that focused on proper posture and muscle activation to decrease stress placed on lumbar spine. Patient education:  2/23 Updated HEP, stressed importance of consistency with HEP and precautions at work such as lifting heavy weights that could place increase stress on lumbar spine. 2/11: Educated pt on body mechanics while at work, & consistency of HEP. Updated HEP  2/3: Pt educated on deficits, anatomy of lumbar spine and pelvis, POC, HEP and importance of compliance, and proper body mechanics while lifting, pushing, pulling, and carrying objects at work. [address] [place] [address] code: [id number]     Prognosis: [] Good [] Fair  [] Poor    Patient Requires Follow-up: [x] Yes  [] No    PLAN: See eval  [] Continue per plan of care [] Alter current plan (see comments)  [x] Plan of care initiated [] Hold pending MD visit [] Discharge  2/25 Monitor symptoms and progress with abdominal and hip strengthening in functional positions such as squatting and side stepping as tolerated. 2/3: Plan to see pt 2x/week for 4 weeks in order to increase LS ROM, strength, and flexibility in order to decrease pain and increase function. Electronically signed by: Lenard Mesa PT, DPT      *If patient does not return for further follow ups after this date. Please consider this as the patients discharge from physical therapy.

## 2021-03-02 ENCOUNTER — HOSPITAL ENCOUNTER (OUTPATIENT)
Dept: PHYSICAL THERAPY | Age: 36
Setting detail: THERAPIES SERIES
Discharge: HOME | End: 2021-03-02
Payer: COMMERCIAL

## 2021-03-02 PROCEDURE — 97112 NEUROMUSCULAR REEDUCATION: CPT

## 2021-03-02 PROCEDURE — 97110 THERAPEUTIC EXERCISES: CPT

## 2021-03-02 PROCEDURE — 97140 MANUAL THERAPY 1/> REGIONS: CPT

## 2021-03-02 NOTE — FLOWSHEET NOTE
The [address] [place] [address],[address], [address], [address] [place] [address]  Phone: [phone]   Fax:     [phone]    Physical Therapy Daily Treatment Note  Date:  3/2/2021    Patient Name:  Shlomo Truong    :  1985  MRN: [id number]  Restrictions/Precautions:    Medical/Treatment Diagnosis Information:  Diagnosis: M54.5, G89.29 (ICD-10-CM) - Chronic right-sided low back pain without sciatica  Treatment Diagnosis: M54.5 Lumbar pain  Insurance/Certification information:  PT Insurance Information: [facility] Plan  Physician Information:  Referring Practitioner: Jenn Duque MD  Has the plan of care been signed (Y/N):        [x]  Yes  []  No     Date of Patient follow up with Physician: Not scheduled yet      Is this a Progress Report:     []  Yes  [x]  No        If Yes:  Date Range for reporting period:  Beginnin/3/2021  Ending    Progress report will be due (10 Rx or 30 days whichever is less): 4/3/2171      Recertification will be due (POC Duration  / 90 days whichever is less): 2021        Visit # Insurance Allowable Auth Required   5  3/2  [facility]  30 visits []  Yes [x]  No        Functional Scale: [place] 22% deficit Date assessed:  2/3/2021     Latex Allergy:  []NO      []YES  Preferred Language for Healthcare:   [x]English       []other      Pain level:  3/2: 5/10      SUBJECTIVE:  3/2 Pt had to work 4 days in a row following her last visit causing increase in pain by  causing her to take 4 Ibuprofen. Symptoms have slightly improved but continues to have pain at R low back.  Denies having symptoms down her R LE.      OBJECTIVE: See eval   Observation: Tightness noted at lumbar paraspinals L>R, tenderness R distal lumbar parapsinals 3/2   Test measurements:      RESTRICTIONS/PRECAUTIONS: N/A    Exercises/Interventions:   ROM/stretches     Hamstring stretch  30\"hx3 supine bilat  ^   SKTC     DKTC   and ADLs  [] (76697) Provided training and instruction to the patient for proper core and proximal hip recruitment and positioning with ambulation re-education     Home Exercise Program:    [x] (33183) Reviewed/Progressed HEP activities related to strengthening, flexibility, endurance, ROM of core, proximal hip and LE for functional self-care, mobility, lifting and ambulation   [] (66146) Reviewed/Progressed HEP activities related to improving balance, coordination, kinesthetic sense, posture, motor skill, proprioception of core, proximal hip and LE for self care, mobility, lifting, and ambulation      Manual Treatments:  PROM / STM / Oscillations-Mobs:  G-I, II, III, IV (PA's, Inf., Post.)  [x] (75886) Provided manual therapy to mobilize proximal hip and LS spine soft tissue/joints for the purpose of modulating pain, promoting relaxation,  increasing ROM, reducing/eliminating soft tissue swelling/inflammation/restriction, improving soft tissue extensibility and allowing for proper ROM for normal function with self care, mobility, lifting and ambulation. Modalities:    2/11 N/A    Charges:  Timed Code Treatment Minutes: 37'   Total Treatment Minutes: 10:16-11:04  48'       [] EVAL (LOW) 15761 (typically 20 minutes face-to-face)  [] EVAL (MOD) 36354 (typically 30 minutes face-to-face)  [] EVAL (HIGH) 17341 (typically 45 minutes face-to-face)  [] RE-EVAL     [x] [place](17932) x 1    [] IONTO  [x] NMR (29286) x 1    [] VASO  [x] Manual (59529) x  1    [] Other:  [] TA x      [] Mech Traction (99417)  [] ES(attended) (09596)      [] ES (un) (25986):     Goals:   Patient stated goal: To be able to sit without back pain and being able to play with her kids on the floor again. [] Progressing: [] Met: [] Not Met: [] Adjusted    Therapist goals for Patient:   Short Term Goals: To be achieved in: 2 weeks  1. Independent in HEP and progression per patient tolerance, in order to prevent re-injury.      [] Progressing: [] Met: [] Not Met: [] Adjusted   2. Patient will have a decrease in pain to facilitate improvement in movement, function, and ADLs as indicated by Functional Deficits. [] Progressing: [] Met: [] Not Met: [] Adjusted    Long Term Goals: To be achieved in: 4 weeks  1. Disability index score of 10% or less for the Dannyan to assist with reaching prior level of function. [] Progressing: [] Met: [] Not Met: [] Adjusted  2. Patient will demonstrate increased LS AROM mobility to WNL to allow for proper joint functioning as indicated by patients Functional Deficits. [] Progressing: [] Met: [] Not Met: [] Adjusted  3. Patient will demonstrate an increase in Strength of 4+/5 of BLE in order to allow for proper functional mobility as indicated by patients Functional Deficits. [] Progressing: [] Met: [] Not Met: [] Adjusted  4. Patient will return to all functional activities without increased symptoms or restriction. [] Progressing: [] Met: [] Not Met: [] Adjusted  5. Pt will be able to sit for one hour without increased symptoms or restriction. [] Progressing: [] Met: [] Not Met: [] Adjusted     Overall Progression Towards Functional goals/ Treatment Progress Update:  [] Patient is progressing as expected towards functional goals listed. [] Progression is slowed due to complexities/Impairments listed. [] Progression has been slowed due to co-morbidities.   [x] Plan just implemented, too soon to assess goals progression <30days   [] Goals require adjustment due to lack of progress  [] Patient is not progressing as expected and requires additional follow up with physician  [] Other    Prognosis for POC: [x] Good [] Fair  [] Poor      Patient requires continued skilled intervention: [x] Yes  [] No    Treatment/Activity Tolerance:  [] Patient able to complete treatment  [] Patient limited by fatigue  [] Patient limited by pain     [] Patient limited by other medical complications  [] Other:   3/2 increase guarding at lumbar paraspinals L>R compared to last visit. Even though increase muscle guarding was noted on L tenderness/ symptoms were located around L4-L5 facets on R. Fatigued with treatment and required cues to not extend at lumbar spine especially when working on gluteal muscles. Tolerated treatment without increase in symptoms     Patient education:  3/2 Instructed on importance of icing for symptom control   2/23 Updated HEP, stressed importance of consistency with HEP and precautions at work such as lifting heavy weights that could place increase stress on lumbar spine. 2/11: Educated pt on body mechanics while at work, & consistency of HEP. Updated HEP  2/3: Pt educated on deficits, anatomy of lumbar spine and pelvis, POC, HEP and importance of compliance, and proper body mechanics while lifting, pushing, pulling, and carrying objects at work. Blas [employer] code: [email or web address]     Prognosis: [] Good [] Fair  [] Poor    Patient Requires Follow-up: [x] Yes  [] No    PLAN: See eval  [] Continue per plan of care [] Alter current plan (see comments)  [x] Plan of care initiated [] Hold pending MD visit [] Discharge  3/2 complete progress note NPV. Monitor symptoms and increase strength as tolerated. 2/3: Plan to see pt 2x/week for 4 weeks in order to increase LS ROM, strength, and flexibility in order to decrease pain and increase function. Electronically signed by: Rosita Hansen PT, DPT      *If patient does not return for further follow ups after this date. Please consider this as the patients discharge from physical therapy.

## 2021-03-04 ENCOUNTER — HOSPITAL ENCOUNTER (OUTPATIENT)
Dept: PHYSICAL THERAPY | Age: 36
Setting detail: THERAPIES SERIES
Discharge: HOME | End: 2021-03-04
Payer: COMMERCIAL

## 2021-03-04 PROCEDURE — 97112 NEUROMUSCULAR REEDUCATION: CPT

## 2021-03-04 PROCEDURE — 97110 THERAPEUTIC EXERCISES: CPT

## 2021-03-04 PROCEDURE — 97140 MANUAL THERAPY 1/> REGIONS: CPT

## 2021-03-04 NOTE — PROGRESS NOTES
[facility] [address] [place] [address], [address] [place] [address]  Phone: [phone]   Fax:     [phone]    Physical Therapy Daily Treatment Note  Date:  3/4/2021    Patient Name:  Ayse Mcgowan    :  1985  MRN: [id number]  Restrictions/Precautions:    Medical/Treatment Diagnosis Information:  Diagnosis: M54.5, G89.29 (ICD-10-CM) - Chronic right-sided low back pain without sciatica  Treatment Diagnosis: M54.5 Lumbar pain  Insurance/Certification information:  PT Insurance Information: [facility] Plan  Physician Information:  Referring Practitioner: Yvonne Marcum MD  Has the plan of care been signed (Y/N):        [x]  Yes  []  No     Date of Patient follow up with Physician: Not scheduled yet      Is this a Progress Report:     [x]  Yes  []  No        If Yes:  Date Range for reporting period:  Beginnin/3/2021  Ending 3/4/21    Progress report will be due (10 Rx or 30 days whichever is less): 85      Recertification will be due (POC Duration  / 90 days whichever is less): 2021        Visit # Insurance Allowable Auth Required   6  3/4  [facility]  30 visits []  Yes [x]  No        Functional Scale:   [place] 3% deficit Date assessed: 3/4/21  [place] 22% deficit Date assessed:  2/3/2021     Latex Allergy:  []NO      []YES  Preferred Language for Healthcare:   [x]English       []other      Pain level:  3/4: 0/10      SUBJECTIVE:  3/4: Pt states she is doing well and not having any pain. Reports working yesterday without issues but symptoms are not as bad with mid shift compared to closing.       OBJECTIVE: Updated for progress note 3/4/21:  ROM   Comments   Trunk flexion 78 deg No pain   Trunk extension 16 deg No pain   Trunk R sidebend 20 cm     Trunk L sidebend 20 cm      Trunk R rotation WNL       Trunk L rotation WNL       HS flexibility                          Strength Left Right Comments   Hip flexion(L2) 4 4- mild R low back pain     Knee extension(L3) 5 5     Knee flexion(S1-2) 5 5     Ankle dorsiflexion(L4) 5 5     Ankle eversion 5 5     Hip abd   4  4-         Squat: Excessive bilat forward knee flexion, depth WNL, bilat ER, pt stated R low back felt a pulling sensation      Joint mobility:               []? Normal mild end range restriction bilat with P-A assessment but no significant hypomobility noted               []? Hypo              []? Hyper     Palpation: Mild tenderness/ guarding R lumbar paraspinals     Functional Mobility/Transfers: Independent      Posture: In sitting, forward head, rounded, shoulders.  Standing WNL      Bandages/Dressings/Incisions: N/A     Gait: independent and WNL besides mild forefoot pronation and ankle eversion on R    RESTRICTIONS/PRECAUTIONS: N/A    Exercises/Interventions:   ROM/stretches     Hamstring stretch  30\"hx3 supine bilat  ^2/23   SKTC     DKTC  2/23 withheld d/t decrease stretch reported    Child's pose 10\"hx10 2/23   Supine HS     Pelvic tilt + TA  2/11 progressed to TA supine march    Lateral trunk rotation on SB  ^2/11   Sidely thoracic mobilization  30\"hx3 sidely L  ^2/23   Cat and camel 3x10 2/23 quadruped         Strengthening     SLR     TA supine march  3x10  ^2/23 starting position 90/90    BKFO Black TB 5\"h 2x10 bilat ^3/2  3/4 pt increased reps d/t confusion with prescribed amount   TA supine UE alt overhead 3x10 2# med ball ^2/23 LE 90/90    Glute sets + TA   2/23 progressed with bridges   Bridges Black TB 3x10  ^3/2   Seated marches on Blue SB 3x10  Start 2/11  3/4 decrease UE support required   TB Ext Green TB 3x10 bilat Start 2/25 cues in standing for proper TA activation to avoid lumbar extension   Pallof press Green TB 3x10 bilat ^3/4   Standing hip abd 2x10 bilat Start 3/4 cues to maintain level hips and decrease rotation at lumbar spine                            Postural education   3/2 reviewed proper posture during exercises but did not spend significant time on treatment compared to last visit. Manual Intervention      R LE LAD   Start 2/3   STM  Lumbar parapsinals L2-5 R  x4' 3/4   Prone PA Prone L2-L5 on bilat P-A grade 3 x'3/4   GISTM/STM     Lumbar Manip     SI Manip     Hip belt mobs     Hip LA distraction            Therapeutic Exercise and NMR EXR  [x] (58816) Provided verbal/tactile cueing for activities related to strengthening, flexibility, endurance, ROM  for improvements in proximal hip and core control with self care, mobility, lifting and ambulation. [x] (33980) Provided verbal/tactile cueing for activities related to improving balance, coordination, kinesthetic sense, posture, motor skill, proprioception  to assist with core control in self care, mobility, lifting, and ambulation.      Therapeutic Activities:    [] (75942 or 69930) Provided verbal/tactile cueing for activities related to improving balance, coordination, kinesthetic sense, posture, motor skill, proprioception and motor activation to allow for proper function  with self care and ADLs  [] (41310) Provided training and instruction to the patient for proper core and proximal hip recruitment and positioning with ambulation re-education     Home Exercise Program:    [x] (17797) Reviewed/Progressed HEP activities related to strengthening, flexibility, endurance, ROM of core, proximal hip and LE for functional self-care, mobility, lifting and ambulation   [] (47336) Reviewed/Progressed HEP activities related to improving balance, coordination, kinesthetic sense, posture, motor skill, proprioception of core, proximal hip and LE for self care, mobility, lifting, and ambulation      Manual Treatments:  PROM / STM / Oscillations-Mobs:  G-I, II, III, IV (PA's, Inf., Post.)  [x] (84246) Provided manual therapy to mobilize proximal hip and LS spine soft tissue/joints for the purpose of modulating pain, promoting relaxation,  increasing ROM, reducing/eliminating soft tissue swelling/inflammation/restriction, improving soft tissue extensibility and allowing for proper ROM for normal function with self care, mobility, lifting and ambulation. Modalities:    2/11 N/A    Charges:  Timed Code Treatment Minutes: 52'   Total Treatment Minutes: 10:13-11:10  62'       [] EVAL (LOW) 89720 (typically 20 minutes face-to-face)  [] EVAL (MOD) 77922 (typically 30 minutes face-to-face)  [] EVAL (HIGH) 51606 (typically 45 minutes face-to-face)  [] RE-EVAL     [x] VR(35384) x 1    [] IONTO  [x] NMR (54261) x 1    [] VASO  [x] Manual (74563) x  1    [] Other:  [] TA x      [] Mech Traction (23866)  [] ES(attended) (51754)      [] ES (un) (27995):     Goals:   Patient stated goal: To be able to sit without back pain and being able to play with her kids on the floor again. [x] Progressing: [] Met: [] Not Met: [] Adjusted    Therapist goals for Patient:   Short Term Goals: To be achieved in: 2 weeks  1. Independent in HEP and progression per patient tolerance, in order to prevent re-injury. [x] Progressing: [] Met: [] Not Met: [] Adjusted   2. Patient will have a decrease in pain to facilitate improvement in movement, function, and ADLs as indicated by Functional Deficits. [x] Progressing: [] Met: [] Not Met: [] Adjusted    Long Term Goals: To be achieved in: 4 weeks  1. Disability index score of 10% or less for the [place] to assist with reaching prior level of function. [] Progressing: [x] Met: [] Not Met: [] Adjusted  2. Patient will demonstrate increased LS AROM mobility to WNL to allow for proper joint functioning as indicated by patients Functional Deficits. [x] Progressing: [] Met: [] Not Met: [] Adjusted  3. Patient will demonstrate an increase in Strength of 4+/5 of BLE in order to allow for proper functional mobility as indicated by patients Functional Deficits. [x] Progressing: [] Met: [] Not Met: [] Adjusted  4.  Patient will return to all functional activities without increased movement with work related activities. 3/2 Instructed on importance of icing for symptom control   2/23 Updated HEP, stressed importance of consistency with HEP and precautions at work such as lifting heavy weights that could place increase stress on lumbar spine. 2/11: Educated pt on body mechanics while at work, & consistency of HEP. Updated HEP  2/3: Pt educated on deficits, anatomy of lumbar spine and pelvis, POC, HEP and importance of compliance, and proper body mechanics while lifting, pushing, pulling, and carrying objects at work. [address] [place] [address] code: [id number]     Prognosis: [] Good [] Fair  [] Poor    Patient Requires Follow-up: [x] Yes  [] No    PLAN: See eval  [] Continue per plan of care [] Alter current plan (see comments)  [x] Plan of care initiated [] Hold pending MD visit [] Discharge  3/4 Continue with PT 1-2x a week for 4 weeks to further increase hip/abdominal strength and core stabilization with functional tasks in order to decrease symptoms with work related activities such as lifting. Electronically signed by: Syed Betts PT, DPT      *If patient does not return for further follow ups after this date. Please consider this as the patients discharge from physical therapy.

## 2021-03-10 ENCOUNTER — HOSPITAL ENCOUNTER (OUTPATIENT)
Dept: PHYSICAL THERAPY | Age: 36
Setting detail: THERAPIES SERIES
Discharge: HOME | End: 2021-03-10
Payer: COMMERCIAL

## 2021-03-10 PROCEDURE — 97110 THERAPEUTIC EXERCISES: CPT

## 2021-03-10 PROCEDURE — 97112 NEUROMUSCULAR REEDUCATION: CPT

## 2021-03-10 PROCEDURE — G0283 ELEC STIM OTHER THAN WOUND: HCPCS

## 2021-03-10 PROCEDURE — 97140 MANUAL THERAPY 1/> REGIONS: CPT

## 2021-03-10 NOTE — FLOWSHEET NOTE
The [address] [place] [address],[address], [address], [address] [place] [address]  Phone: [phone]   Fax:     [phone]    Physical Therapy Daily Treatment Note  Date:  3/10/2021    Patient Name:  Bhakti Whatley    :  1985  MRN: [id number]  Restrictions/Precautions:    Medical/Treatment Diagnosis Information:  Diagnosis: M54.5, G89.29 (ICD-10-CM) - Chronic right-sided low back pain without sciatica  Treatment Diagnosis: M54.5 Lumbar pain  Insurance/Certification information:  PT Insurance Information: [facility] Plan  Physician Information:  Referring Practitioner: Jeremiah Brooke MD  Has the plan of care been signed (Y/N):        [x]  Yes  []  No     Date of Patient follow up with Physician: Not scheduled yet      Is this a Progress Report:     []  Yes  [x]  No        If Yes:  Date Range for reporting period:  Beginnin/3/2021  Ending 3/4/21    Progress report will be due (10 Rx or 30 days whichever is less): 8/3/17      Recertification will be due (POC Duration  / 90 days whichever is less): 2021        Visit # Insurance Allowable Auth Required   7  3/10  [facility]  30 visits []  Yes [x]  No        Functional Scale:   VKVTT[place] 3% deficit Date assessed: 3/4/21  [place] 22% deficit Date assessed:  2/3/2021     Latex Allergy:  []NO      []YES  Preferred Language for Healthcare:   [x]English       []other      Pain level:  3/10: 7/10      SUBJECTIVE:  3/10 Pt states she worked 6 days in an row causing increase low back on R. Reports she was having a sharp pain 2 days ago that improved with Ibuprofun. Today symptoms described as dull ache.        OBJECTIVE: Updated for progress note 3/4/21:  ROM   Comments   Trunk flexion 78 deg No pain   Trunk extension 16 deg No pain   Trunk R sidebend 20 cm     Trunk L sidebend 20 cm      Trunk R rotation WNL       Trunk L rotation WNL       HS flexibility                          Strength Left Right Comments   Hip flexion(L2) 4 4- mild R low back pain     Knee extension(L3) 5 5     Knee flexion(S1-2) 5 5     Ankle dorsiflexion(L4) 5 5     Ankle eversion 5 5     Hip abd   4  4-         Squat: Excessive bilat forward knee flexion, depth WNL, bilat ER, pt stated R low back felt a pulling sensation      Joint mobility:               []? Normal mild end range restriction bilat with P-A assessment but no significant hypomobility noted               []? Hypo              []? Hyper     Palpation: Mild tenderness/ guarding R lumbar paraspinals     Functional Mobility/Transfers: Independent      Posture: In sitting, forward head, rounded, shoulders.  Standing WNL      Bandages/Dressings/Incisions: N/A     Gait: independent and WNL besides mild forefoot pronation and ankle eversion on R    RESTRICTIONS/PRECAUTIONS: N/A    Exercises/Interventions:   ROM/stretches     Hamstring stretch  30\"hx3 supine bilat  ^2/23   [address]     DKTC  2/23 withheld d/t decrease stretch reported    Child's pose  3/10 withheld d/t no stretch in low back reported   Supine HS     Pelvic tilt + TA  2/11 progressed to TA supine march    Lateral trunk rotation on SB 3x10  3/10 reintroduced d/t symptoms    Sidely thoracic mobilization  10\"bs12vhyigm L  3/10 decrease hold time d/t symptoms and emphasis on easing into stretch   Cat and camel  3/10 withheld d/t symptoms   Seated lumbar extension 5\"hx10 bilat 3/10    Strengthening     SLR     TA supine march  3x10  ^2/23 starting position 90/90    BKFO Black TB 5\"h 2x10 bilat ^3/2  3/4 pt increased reps d/t confusion with prescribed amount   TA supine UE alt overhead 3x10 2# med ball ^2/23 LE 90/90    Glute sets + TA   2/23 progressed with bridges   Bridges Black TB 3x10  ^3/2   TA with LE ext 3x10 bilat 3/10 hook lying   Seated marches on Blue SB  3/10 withheld d/t flare up in symptoms    TB Ext Green TB 3x10 bilat Start 2/25 cues in standing for proper TA activation to avoid lumbar extension   Pallof Post.)  [x] (84627) Provided manual therapy to mobilize proximal hip and LS spine soft tissue/joints for the purpose of modulating pain, promoting relaxation,  increasing ROM, reducing/eliminating soft tissue swelling/inflammation/restriction, improving soft tissue extensibility and allowing for proper ROM for normal function with self care, mobility, lifting and ambulation. Modalities:  E-Stim, Premod R lumbar paraspinals with CP x15' 3/10     Charges:  Timed Code Treatment Minutes: 39'   Total Treatment Minutes: 10:24-11:29  72'       [] EVAL (LOW) 05241 (typically 20 minutes face-to-face)  [] EVAL (MOD) 78639 (typically 30 minutes face-to-face)  [] EVAL (HIGH) 22041 (typically 45 minutes face-to-face)  [] RE-EVAL     [x] HJ(70731) x 1    [] IONTO  [x] NMR (21848) x 1    [] VASO  [x] Manual (16462) x  1    [] Other:  [] TA x      [] Mech Traction (93449)  [] ES(attended) (46483)      [x] ES (un) (83226):     Goals:   Patient stated goal: To be able to sit without back pain and being able to play with her kids on the floor again. [x] Progressing: [] Met: [] Not Met: [] Adjusted    Therapist goals for Patient:   Short Term Goals: To be achieved in: 2 weeks  1. Independent in HEP and progression per patient tolerance, in order to prevent re-injury. [x] Progressing: [] Met: [] Not Met: [] Adjusted   2. Patient will have a decrease in pain to facilitate improvement in movement, function, and ADLs as indicated by Functional Deficits. [x] Progressing: [] Met: [] Not Met: [] Adjusted    Long Term Goals: To be achieved in: 4 weeks  1. Disability index score of 10% or less for the [place] to assist with reaching prior level of function. [] Progressing: [x] Met: [] Not Met: [] Adjusted  2. Patient will demonstrate increased LS AROM mobility to WNL to allow for proper joint functioning as indicated by patients Functional Deficits. [x] Progressing: [] Met: [] Not Met: [] Adjusted  3.  Patient will demonstrate an increase in Strength of 4+/5 of BLE in order to allow for proper functional mobility as indicated by patients Functional Deficits. [x] Progressing: [] Met: [] Not Met: [] Adjusted  4. Patient will return to all functional activities without increased symptoms or restriction. [x] Progressing: [] Met: [] Not Met: [] Adjusted  5. Pt will be able to sit for one hour without increased symptoms or restriction. [x] Progressing: [] Met: [] Not Met: [] Adjusted     Overall Progression Towards Functional goals/ Treatment Progress Update:  [x] Patient is progressing as expected towards functional goals listed with increase in trunk mobility and lumbar joint mobility allowing for decrease in severity of symptoms and increase in function. She continues to have strength deficits and compensates into excessive lumbar extension with squatting causing continued pain with lifting at work. Based on improvement and continued symptoms she would benefit from more PT. 3/4/21    [] Progression is slowed due to complexities/Impairments listed. [] Progression has been slowed due to co-morbidities. [] Plan just implemented, too soon to assess goals progression <30days   [] Goals require adjustment due to lack of progress  [] Patient is not progressing as expected and requires additional follow up with physician  [] Other    Prognosis for POC: [x] Good [] Fair  [] Poor      Patient requires continued skilled intervention: [x] Yes  [] No    Treatment/Activity Tolerance:  [] Patient able to complete treatment  [] Patient limited by fatigue  [] Patient limited by pain     [] Patient limited by other medical complications  [] Other:   3/10 No increase in lumbar paraspinals noted but increase in tenderness at R distal lumbar facets d/t excessive extension with lifting at work. Responded well to lumbar traction and stretches reporting that she felt more loose. Tolerated abdominal/ hip strengthening with no adverse effects.  Responded well to electrical stimulation with CP allowing for decrease in severity of symptoms. Patient education:  3/9 Updated HEP and reviewed activity modification in order to decrease severity of symptoms. 3/4 Updated HEP and reviewed importance of continuing to increase abdominal/ hip strength and core stabilization to improve functional movement with work related activities. 3/2 Instructed on importance of icing for symptom control   2/23 Updated HEP, stressed importance of consistency with HEP and precautions at work such as lifting heavy weights that could place increase stress on lumbar spine. 2/11: Educated pt on body mechanics while at work, & consistency of HEP. Updated HEP  2/3: Pt educated on deficits, anatomy of lumbar spine and pelvis, POC, HEP and importance of compliance, and proper body mechanics while lifting, pushing, pulling, and carrying objects at work. [facility] code: [id number]     Prognosis: [] Good [] Fair  [] Poor    Patient Requires Follow-up: [x] Yes  [] No    PLAN: See eval  [] Continue per plan of care [] Alter current plan (see comments)  [x] Plan of care initiated [] Hold pending MD visit [] Discharge  3/10 Monitor symptoms and progress as tolerated with hip/ abdominal strengthening and core stabilization  3/4 Continue with PT 1-2x a week for 4 weeks to further increase hip/abdominal strength and core stabilization with functional tasks in order to decrease symptoms with work related activities such as lifting. Electronically signed by: Swati Contreras PT, DPT      *If patient does not return for further follow ups after this date. Please consider this as the patients discharge from physical therapy.

## 2021-03-12 ENCOUNTER — HOSPITAL ENCOUNTER (OUTPATIENT)
Dept: PHYSICAL THERAPY | Age: 36
Setting detail: THERAPIES SERIES
Discharge: HOME | End: 2021-03-12
Payer: COMMERCIAL

## 2021-03-12 PROCEDURE — 97140 MANUAL THERAPY 1/> REGIONS: CPT

## 2021-03-12 PROCEDURE — 97110 THERAPEUTIC EXERCISES: CPT

## 2021-03-12 PROCEDURE — 97112 NEUROMUSCULAR REEDUCATION: CPT

## 2021-03-12 NOTE — FLOWSHEET NOTE
[facility], [address] [place] [address], [address] [place] [address]  Phone: [phone]   Fax:     [phone]    Physical Therapy Daily Treatment Note  Date:  3/12/2021    Patient Name:  Rigo Arteaga    :  1985  MRN: [id number]  Restrictions/Precautions:    Medical/Treatment Diagnosis Information:  Diagnosis: M54.5, G89.29 (ICD-10-CM) - Chronic right-sided low back pain without sciatica  Treatment Diagnosis: M54.5 Lumbar pain  Insurance/Certification information:  PT Insurance Information: [facility] Plan  Physician Information:  Referring Practitioner: Viv Frank MD  Has the plan of care been signed (Y/N):        [x]  Yes  []  No     Date of Patient follow up with Physician: Not scheduled yet      Is this a Progress Report:     []  Yes  [x]  No        If Yes:  Date Range for reporting period:  Beginnin/3/2021  Ending 3/4/21    Progress report will be due (10 Rx or 30 days whichever is less): 5/3/24      Recertification will be due (POC Duration  / 90 days whichever is less): 2021        Visit # Insurance Allowable Auth Required   8  3/12  [facility]  30 visits []  Yes [x]  No        Functional Scale:   XXGJDR 3% deficit Date assessed: 3/4/21  [place] 22% deficit Date assessed:  2/3/2021     Latex Allergy:  []NO      []YES  Preferred Language for Healthcare:   [x]English       []other      Pain level:  3/12: 4/10      SUBJECTIVE:  3/12 Pt states her low back has felt better the past 2 days. Had increase symptoms the evening following her last visit and reports pain down inner thigh about 1/3 of the way down however symptoms were gone the next day.       OBJECTIVE: Updated for progress note 3/4/21:  ROM   Comments   Trunk flexion 78 deg No pain   Trunk extension 16 deg No pain   Trunk R sidebend 20 cm     Trunk L sidebend 20 cm      Trunk R rotation WNL       Trunk L rotation WNL       HS flexibility           TB Ext Green TB 3x10 bilat Start 2/25 cues in standing for proper TA activation to avoid lumbar extension   Pallof press 3/10 withheld d/t flare up in symptoms   Standing hip abd 3/10 withheld d/t flare up in symptoms                             Postural education   3/2 reviewed proper posture during exercises but did not spend significant time on treatment compared to last visit. Manual Intervention      R LE LAD   Start 2/3   STM  3/10 no signficant muscle guarding noted   Prone PA 3/10 withheld d/t significant tenderness R L3-L5   Manual lumbar traction 20\"hold:10\" rest x8' 3/10 supine with LE on SB     Therapeutic Exercise and NMR EXR  [x] (08303) Provided verbal/tactile cueing for activities related to strengthening, flexibility, endurance, ROM  for improvements in proximal hip and core control with self care, mobility, lifting and ambulation. [x] (53927) Provided verbal/tactile cueing for activities related to improving balance, coordination, kinesthetic sense, posture, motor skill, proprioception  to assist with core control in self care, mobility, lifting, and ambulation.      Therapeutic Activities:    [] (47536 or 07461) Provided verbal/tactile cueing for activities related to improving balance, coordination, kinesthetic sense, posture, motor skill, proprioception and motor activation to allow for proper function  with self care and ADLs  [] (25516) Provided training and instruction to the patient for proper core and proximal hip recruitment and positioning with ambulation re-education     Home Exercise Program:    [x] (21381) Reviewed/Progressed HEP activities related to strengthening, flexibility, endurance, ROM of core, proximal hip and LE for functional self-care, mobility, lifting and ambulation   [] (34733) Reviewed/Progressed HEP activities related to improving balance, coordination, kinesthetic sense, posture, motor skill, proprioception of core, proximal hip and LE for self care, mobility, lifting, and ambulation      Manual Treatments:  PROM / STM / Oscillations-Mobs:  G-I, II, III, IV (PA's, Inf., Post.)  [x] (60888) Provided manual therapy to mobilize proximal hip and LS spine soft tissue/joints for the purpose of modulating pain, promoting relaxation,  increasing ROM, reducing/eliminating soft tissue swelling/inflammation/restriction, improving soft tissue extensibility and allowing for proper ROM for normal function with self care, mobility, lifting and ambulation. Modalities:  E-Stim, Premod R lumbar paraspinals with CP x15' 3/10     Charges:  Timed Code Treatment Minutes: 44'   Total Treatment Minutes: 1:29-2:10  39'       [] EVAL (LOW) 40059 (typically 20 minutes face-to-face)  [] EVAL (MOD) 14518 (typically 30 minutes face-to-face)  [] EVAL (HIGH) 12231 (typically 45 minutes face-to-face)  [] RE-EVAL     [x] PY(90454) x 1    [] IONTO  [x] NMR (15424) x 1    [] VASO  [x] Manual (73734) x  1    [] Other:  [] TA x      [] Mech Traction (86369)  [] ES(attended) (57357)      [x] ES (un) (87036):     Goals:   Patient stated goal: To be able to sit without back pain and being able to play with her kids on the floor again. [x] Progressing: [] Met: [] Not Met: [] Adjusted    Therapist goals for Patient:   Short Term Goals: To be achieved in: 2 weeks  1. Independent in HEP and progression per patient tolerance, in order to prevent re-injury. [x] Progressing: [] Met: [] Not Met: [] Adjusted   2. Patient will have a decrease in pain to facilitate improvement in movement, function, and ADLs as indicated by Functional Deficits. [x] Progressing: [] Met: [] Not Met: [] Adjusted    Long Term Goals: To be achieved in: 4 weeks  1. Disability index score of 10% or less for the [place] to assist with reaching prior level of function. [] Progressing: [x] Met: [] Not Met: [] Adjusted  2.  Patient will demonstrate increased LS AROM mobility to WNL to allow for proper joint functioning as indicated by patients Functional Deficits. [x] Progressing: [] Met: [] Not Met: [] Adjusted  3. Patient will demonstrate an increase in Strength of 4+/5 of BLE in order to allow for proper functional mobility as indicated by patients Functional Deficits. [x] Progressing: [] Met: [] Not Met: [] Adjusted  4. Patient will return to all functional activities without increased symptoms or restriction. [x] Progressing: [] Met: [] Not Met: [] Adjusted  5. Pt will be able to sit for one hour without increased symptoms or restriction. [x] Progressing: [] Met: [] Not Met: [] Adjusted     Overall Progression Towards Functional goals/ Treatment Progress Update:  [x] Patient is progressing as expected towards functional goals listed with increase in trunk mobility and lumbar joint mobility allowing for decrease in severity of symptoms and increase in function. She continues to have strength deficits and compensates into excessive lumbar extension with squatting causing continued pain with lifting at work. Based on improvement and continued symptoms she would benefit from more PT. 3/4/21    [] Progression is slowed due to complexities/Impairments listed. [] Progression has been slowed due to co-morbidities. [] Plan just implemented, too soon to assess goals progression <30days   [] Goals require adjustment due to lack of progress  [] Patient is not progressing as expected and requires additional follow up with physician  [] Other    Prognosis for POC: [x] Good [] Fair  [] Poor      Patient requires continued skilled intervention: [x] Yes  [] No    Treatment/Activity Tolerance:  [] Patient able to complete treatment  [] Patient limited by fatigue  [] Patient limited by pain     [] Patient limited by other medical complications  [] Other:   3/12 Cues required to avoid lumbar rotation and extension with core exercises. Responded well to cues and able to complete without adverse effects.  Reported decrease in symptoms to 1-2/10 by end of treatment and had decrease tightness. Patient education:  3/12 Updated HEP   3/9 Updated HEP and reviewed activity modification in order to decrease severity of symptoms. 3/4 Updated HEP and reviewed importance of continuing to increase abdominal/ hip strength and core stabilization to improve functional movement with work related activities. 3/2 Instructed on importance of icing for symptom control   2/23 Updated HEP, stressed importance of consistency with HEP and precautions at work such as lifting heavy weights that could place increase stress on lumbar spine. 2/11: Educated pt on body mechanics while at work, & consistency of HEP. Updated HEP  2/3: Pt educated on deficits, anatomy of lumbar spine and pelvis, POC, HEP and importance of compliance, and proper body mechanics while lifting, pushing, pulling, and carrying objects at work. [facility] code: [id number]     Prognosis: [] Good [] Fair  [] Poor    Patient Requires Follow-up: [x] Yes  [] No    PLAN: See eval  [] Continue per plan of care [] Alter current plan (see comments)  [x] Plan of care initiated [] Hold pending MD visit [] Discharge  3/12 Monitor symptoms and progress as tolerated with hip/ abdominal strengthening and core stabilization  3/4 Continue with PT 1-2x a week for 4 weeks to further increase hip/abdominal strength and core stabilization with functional tasks in order to decrease symptoms with work related activities such as lifting. Electronically signed by: Sha Khalil PT, DPT      *If patient does not return for further follow ups after this date. Please consider this as the patients discharge from physical therapy.

## 2021-03-16 ENCOUNTER — HOSPITAL ENCOUNTER (OUTPATIENT)
Dept: PHYSICAL THERAPY | Age: 36
Setting detail: THERAPIES SERIES
Discharge: HOME | End: 2021-03-16
Payer: COMMERCIAL

## 2021-03-16 PROCEDURE — 97140 MANUAL THERAPY 1/> REGIONS: CPT

## 2021-03-16 PROCEDURE — 97110 THERAPEUTIC EXERCISES: CPT

## 2021-03-16 PROCEDURE — 97112 NEUROMUSCULAR REEDUCATION: CPT

## 2021-03-16 NOTE — FLOWSHEET NOTE
flexion(L2) 4 4- mild R low back pain     Knee extension(L3) 5 5     Knee flexion(S1-2) 5 5     Ankle dorsiflexion(L4) 5 5     Ankle eversion 5 5     Hip abd   4  4-         Squat: Excessive bilat forward knee flexion, depth WNL, bilat ER, pt stated R low back felt a pulling sensation      Joint mobility:               []? Normal mild end range restriction bilat with P-A assessment but no significant hypomobility noted               []? Hypo              []? Hyper     Palpation: Mild tenderness/ guarding R lumbar paraspinals     Functional Mobility/Transfers: Independent      Posture: In sitting, forward head, rounded, shoulders.  Standing WNL      Bandages/Dressings/Incisions: N/A     Gait: independent and WNL besides mild forefoot pronation and ankle eversion on R    RESTRICTIONS/PRECAUTIONS: N/A    Exercises/Interventions:   ROM/stretches     Hamstring stretch  30\"hx3 supine bilat  ^2/23   SKTC     DKTC  2/23 withheld d/t decrease stretch reported    Child's pose  3/10 withheld d/t no stretch in low back reported   Supine HS     Pelvic tilt + TA  2/11 progressed to TA supine march    Lateral trunk rotation on SB 3x10  3/10 reintroduced d/t symptoms    Sidely thoracic mobilization  10\"hx10 side lying L  3/10 decrease hold time d/t symptoms and emphasis on easing into stretch   Cat and camel  3/10 withheld d/t symptoms   Seated lumbar flexion 5\"hx10 bilat 3/10    Strengthening     Dead bug 3x10 alt UE/LE 3/12   TA supine march  3 3/12 progressed with dead bug   BKFO 3/16 progressed with clams   TA supine UE alt overhead 3 3/12 progressed with dead bug    Clams Blue loop 3x10 bilat 3/16   Bridges Black TB 3x10  ^3/2   TA with LE ext 3x10 bilat 3/10 hook lying  3/12 cues to avoid lumbar extension    Quadruped LE extension 3x10 alt LE 3/12, 1/2 roll on lumbar spine to decrease rotation   Seated marches on Blue SB  3/10 withheld d/t flare up in symptoms    TB Ext Green TB 3x10 bilat Start 2/25 cues in standing for proper TA activation to avoid lumbar extension   Pallof press Green TB 3x10 bilat3/16 reintroduced d/t decrease in severity of symptoms at start of treatment    Standing hip abd 3x10 bilat^3/16 cues to maintain level hips                            Postural education   3/2 reviewed proper posture during exercises but did not spend significant time on treatment compared to last visit. Manual Intervention      R LE LAD   Start 2/3   STM  3/10 no signficant muscle guarding noted   Prone PA 3/10 withheld d/t significant tenderness R L3-L5   Manual lumbar traction 20\"hold:10\" rest x8' 3/10 supine with LE on SB     Therapeutic Exercise and NMR EXR  [x] (16036) Provided verbal/tactile cueing for activities related to strengthening, flexibility, endurance, ROM  for improvements in proximal hip and core control with self care, mobility, lifting and ambulation. [x] (00335) Provided verbal/tactile cueing for activities related to improving balance, coordination, kinesthetic sense, posture, motor skill, proprioception  to assist with core control in self care, mobility, lifting, and ambulation.      Therapeutic Activities:    [] (94152 or 20933) Provided verbal/tactile cueing for activities related to improving balance, coordination, kinesthetic sense, posture, motor skill, proprioception and motor activation to allow for proper function  with self care and ADLs  [] (86215) Provided training and instruction to the patient for proper core and proximal hip recruitment and positioning with ambulation re-education     Home Exercise Program:    [x] (27956) Reviewed/Progressed HEP activities related to strengthening, flexibility, endurance, ROM of core, proximal hip and LE for functional self-care, mobility, lifting and ambulation   [] (67025) Reviewed/Progressed HEP activities related to improving balance, coordination, kinesthetic sense, posture, motor skill, proprioception of core, proximal hip and LE for self care, mobility, lifting, and ambulation      Manual Treatments:  PROM / STM / Oscillations-Mobs:  G-I, II, III, IV (PA's, Inf., Post.)  [x] (37038) Provided manual therapy to mobilize proximal hip and LS spine soft tissue/joints for the purpose of modulating pain, promoting relaxation,  increasing ROM, reducing/eliminating soft tissue swelling/inflammation/restriction, improving soft tissue extensibility and allowing for proper ROM for normal function with self care, mobility, lifting and ambulation. Modalities:  E-Stim withheld d/t decrease in symptoms 3/16     Charges:  Timed Code Treatment Minutes: 37'   Total Treatment Minutes: 10:17-11:05  48'       [] EVAL (LOW) 45283 (typically 20 minutes face-to-face)  [] EVAL (MOD) 37751 (typically 30 minutes face-to-face)  [] EVAL (HIGH) 87194 (typically 45 minutes face-to-face)  [] RE-EVAL     [x] FB(35785) x 1    [] IONTO  [x] NMR (56253) x 1    [] VASO  [x] Manual (15608) x  1    [] Other:  [] TA x      [] Mech Traction (24540)  [] ES(attended) (50084)      [] ES (un) (77350):     Goals:   Patient stated goal: To be able to sit without back pain and being able to play with her kids on the floor again. [x] Progressing: [] Met: [] Not Met: [] Adjusted    Therapist goals for Patient:   Short Term Goals: To be achieved in: 2 weeks  1. Independent in HEP and progression per patient tolerance, in order to prevent re-injury. [x] Progressing: [] Met: [] Not Met: [] Adjusted   2. Patient will have a decrease in pain to facilitate improvement in movement, function, and ADLs as indicated by Functional Deficits. [x] Progressing: [] Met: [] Not Met: [] Adjusted    Long Term Goals: To be achieved in: 4 weeks  1. Disability index score of 10% or less for the [place] to assist with reaching prior level of function. [] Progressing: [x] Met: [] Not Met: [] Adjusted  2.  Patient will demonstrate increased LS AROM mobility to WNL to allow for proper joint functioning as indicated by patients Functional Deficits. [x] Progressing: [] Met: [] Not Met: [] Adjusted  3. Patient will demonstrate an increase in Strength of 4+/5 of BLE in order to allow for proper functional mobility as indicated by patients Functional Deficits. [x] Progressing: [] Met: [] Not Met: [] Adjusted  4. Patient will return to all functional activities without increased symptoms or restriction. [x] Progressing: [] Met: [] Not Met: [] Adjusted  5. Pt will be able to sit for one hour without increased symptoms or restriction. [x] Progressing: [] Met: [] Not Met: [] Adjusted     Overall Progression Towards Functional goals/ Treatment Progress Update:  [x] Patient is progressing as expected towards functional goals listed with increase in trunk mobility and lumbar joint mobility allowing for decrease in severity of symptoms and increase in function. She continues to have strength deficits and compensates into excessive lumbar extension with squatting causing continued pain with lifting at work. Based on improvement and continued symptoms she would benefit from more PT. 3/4/21    [] Progression is slowed due to complexities/Impairments listed. [] Progression has been slowed due to co-morbidities. [] Plan just implemented, too soon to assess goals progression <30days   [] Goals require adjustment due to lack of progress  [] Patient is not progressing as expected and requires additional follow up with physician  [] Other    Prognosis for POC: [x] Good [] Fair  [] Poor      Patient requires continued skilled intervention: [x] Yes  [] No    Treatment/Activity Tolerance:  [] Patient able to complete treatment  [] Patient limited by fatigue  [] Patient limited by pain     [] Patient limited by other medical complications  [] Other:   3/16: Decrease symptoms noted at lumbar spine at start of todays visit. Muslce fatigue noted and reported with increase in intensity of treatment but no increase in symptoms noted.  Cues required to slow down with exercises and focus on proper muscle activation and form especially with quadruped and SLS with hip abduction. Patient education:  3/12 Updated HEP   3/9 Updated HEP and reviewed activity modification in order to decrease severity of symptoms. 3/4 Updated HEP and reviewed importance of continuing to increase abdominal/ hip strength and core stabilization to improve functional movement with work related activities. 3/2 Instructed on importance of icing for symptom control   2/23 Updated HEP, stressed importance of consistency with HEP and precautions at work such as lifting heavy weights that could place increase stress on lumbar spine. 2/11: Educated pt on body mechanics while at work, & consistency of HEP. Updated HEP  2/3: Pt educated on deficits, anatomy of lumbar spine and pelvis, POC, HEP and importance of compliance, and proper body mechanics while lifting, pushing, pulling, and carrying objects at work. [address] [place] [address] code: [id number]     Prognosis: [] Good [] Fair  [] Poor    Patient Requires Follow-up: [x] Yes  [] No    PLAN: See eval  [] Continue per plan of care [] Alter current plan (see comments)  [x] Plan of care initiated [] Hold pending MD visit [] Discharge  3/16 Monitor symptoms and progress as tolerated with hip/ abdominal strengthening and core stabilization  3/4 Continue with PT 1-2x a week for 4 weeks to further increase hip/abdominal strength and core stabilization with functional tasks in order to decrease symptoms with work related activities such as lifting. Electronically signed by: Stacy Fritz PT, DPT      *If patient does not return for further follow ups after this date. Please consider this as the patients discharge from physical therapy.

## 2021-03-18 ENCOUNTER — HOSPITAL ENCOUNTER (OUTPATIENT)
Dept: PHYSICAL THERAPY | Age: 36
Setting detail: THERAPIES SERIES
Discharge: HOME | End: 2021-03-18
Payer: COMMERCIAL

## 2021-03-18 PROCEDURE — 97112 NEUROMUSCULAR REEDUCATION: CPT

## 2021-03-18 PROCEDURE — 97110 THERAPEUTIC EXERCISES: CPT

## 2021-03-18 PROCEDURE — 97140 MANUAL THERAPY 1/> REGIONS: CPT

## 2021-03-18 NOTE — FLOWSHEET NOTE
The [address] [place], [place] [address], [address], [address] [place] [address]  Phone: [phone]   Fax:     [phone]    Physical Therapy Daily Treatment Note  Date:  3/18/2021    Patient Name:  Shlomo Truong    :  1985  MRN: [id number]  Restrictions/Precautions:    Medical/Treatment Diagnosis Information:  Diagnosis: M54.5, G89.29 (ICD-10-CM) - Chronic right-sided low back pain without sciatica  Treatment Diagnosis: M54.5 Lumbar pain  Insurance/Certification information:  PT Insurance Information: [facility] Plan  Physician Information:  Referring Practitioner: Jenn Duque MD  Has the plan of care been signed (Y/N):        [x]  Yes  []  No     Date of Patient follow up with Physician: Not scheduled yet      Is this a Progress Report:     []  Yes  [x]  No        If Yes:  Date Range for reporting period:  Beginnin/3/2021  Ending 3/4/21    Progress report will be due (10 Rx or 30 days whichever is less): 32      Recertification will be due (POC Duration  / 90 days whichever is less): 2021        Visit # Insurance Allowable Auth Required   10  3/18  [facility]  30 visits []  Yes [x]  No        Functional Scale:   IDJNDE 3% deficit Date assessed: 3/4/21  [place] 22% deficit Date assessed:  2/3/2021     Latex Allergy:  []NO      []YES  Preferred Language for Healthcare:   [x]English       []other      Pain level:  3/18: 2/10      SUBJECTIVE:  3/18 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 Symptoms have improved today and not lasting as long as they used to.        OBJECTIVE: Updated for progress note 3/4/21:  ROM   Comments   Trunk flexion 78 deg No pain   Trunk extension 16 deg No pain   Trunk R sidebend 20 cm     Trunk L sidebend 20 cm      Trunk R rotation WNL       Trunk L rotation WNL       HS flexibility                          Strength Left Right Comments   Hip flexion(L2) 4 4- mild R low back pain     Knee extension(L3) 5 5     Knee flexion(S1-2) 5 5     Ankle dorsiflexion(L4) 5 5     Ankle eversion 5 5     Hip abd   4  4-         Squat: Excessive bilat forward knee flexion, depth WNL, bilat ER, pt stated R low back felt a pulling sensation      Joint mobility:               []? Normal mild end range restriction bilat with P-A assessment but no significant hypomobility noted               []? Hypo              []? Hyper     Palpation: Mild tenderness/ guarding R lumbar paraspinals     Functional Mobility/Transfers: Independent      Posture: In sitting, forward head, rounded, shoulders.  Standing WNL      Bandages/Dressings/Incisions: N/A     Gait: independent and WNL besides mild forefoot pronation and ankle eversion on R    RESTRICTIONS/PRECAUTIONS: N/A    Exercises/Interventions:   ROM/stretches     Hamstring stretch  30\"hx3 supine bilat  ^2/23   SKTC     DKTC  2/23 withheld d/t decrease stretch reported    Child's pose  3/10 withheld d/t no stretch in low back reported   Supine HS     Pelvic tilt + TA  2/11 progressed to TA supine march    Lateral trunk rotation on SB 3x10  3/10 reintroduced d/t symptoms    Sidely thoracic mobilization  10\"hx10 side lying L  3/10 decrease hold time d/t symptoms and emphasis on easing into stretch   Cat and camel  3/10 withheld d/t symptoms   Seated lumbar flexion 5\"hx10 bilat 3/10    Strengthening     Dead bug 3x10 alt UE/LE 3/12   TA supine march  3 3/12 progressed with dead bug   BKFO 3/16 progressed with clams   TA supine UE alt overhead 3 3/12 progressed with dead bug    Clams Blue loop 3x10 bilat 3/16   Bridges Blue loop 3x10  ^3/18   TA with LE ext 3x10 bilat ^3/18 LE unsupported 90/90 positron, controlled ROM to decrease lumbar extension    Quadruped LE extension 3x10 alt LE 3/12, 1/2 roll on lumbar spine to decrease rotation   Seated marches on Blue SB  3/10 withheld d/t flare up in symptoms    TB Ext Green TB 3x10 bilat Start 2/25 cues in standing for proper TA activation to avoid lumbar extension   Pallof press Green TB 3x10 bilat3/16 reintroduced d/t decrease in severity of symptoms at start of treatment    Standing hip abd 3x10 bilat^3/16 cues to maintain level hips                            Postural education   3/2 reviewed proper posture during exercises but did not spend significant time on treatment compared to last visit. Manual Intervention      R LE LAD   Start 2/3   STM  3/10 no signficant muscle guarding noted   Prone PA 3/10 withheld d/t significant tenderness R L3-L5   Manual lumbar traction 20\"hold:10\" rest x8' 3/18 supine with LE on SB     Therapeutic Exercise and NMR EXR  [x] (08492) Provided verbal/tactile cueing for activities related to strengthening, flexibility, endurance, ROM  for improvements in proximal hip and core control with self care, mobility, lifting and ambulation. [x] (90454) Provided verbal/tactile cueing for activities related to improving balance, coordination, kinesthetic sense, posture, motor skill, proprioception  to assist with core control in self care, mobility, lifting, and ambulation.      Therapeutic Activities:    [] (78113 or 38362) Provided verbal/tactile cueing for activities related to improving balance, coordination, kinesthetic sense, posture, motor skill, proprioception and motor activation to allow for proper function  with self care and ADLs  [] (28236) Provided training and instruction to the patient for proper core and proximal hip recruitment and positioning with ambulation re-education     Home Exercise Program:    [x] (20607) Reviewed/Progressed HEP activities related to strengthening, flexibility, endurance, ROM of core, proximal hip and LE for functional self-care, mobility, lifting and ambulation   [] (57862) Reviewed/Progressed HEP activities related to improving balance, coordination, kinesthetic sense, posture, motor skill, proprioception of core, proximal hip and LE for self care, mobility, lifting, and ambulation      Manual Treatments:  PROM / STM / Oscillations-Mobs:  G-I, II, III, IV (PA's, Inf., Post.)  [x] (34741) Provided manual therapy to mobilize proximal hip and LS spine soft tissue/joints for the purpose of modulating pain, promoting relaxation,  increasing ROM, reducing/eliminating soft tissue swelling/inflammation/restriction, improving soft tissue extensibility and allowing for proper ROM for normal function with self care, mobility, lifting and ambulation. Modalities:  E-Stim withheld d/t decrease in symptoms 3/16     Charges:  Timed Code Treatment Minutes: 55'   Total Treatment Minutes: 10:18-11:09  51'       [] EVAL (LOW) 61236 (typically 20 minutes face-to-face)  [] EVAL (MOD) 37260 (typically 30 minutes face-to-face)  [] EVAL (HIGH) 32506 (typically 45 minutes face-to-face)  [] RE-EVAL     [x] SZ(40667) x 1    [] IONTO  [x] NMR (25011) x 1    [] VASO  [x] Manual (35492) x  1    [] Other:  [] TA x      [] Mech Traction (81163)  [] ES(attended) (03866)      [] ES (un) (52727):     Goals:   Patient stated goal: To be able to sit without back pain and being able to play with her kids on the floor again. [x] Progressing: [] Met: [] Not Met: [] Adjusted    Therapist goals for Patient:   Short Term Goals: To be achieved in: 2 weeks  1. Independent in HEP and progression per patient tolerance, in order to prevent re-injury. [x] Progressing: [] Met: [] Not Met: [] Adjusted   2. Patient will have a decrease in pain to facilitate improvement in movement, function, and ADLs as indicated by Functional Deficits. [x] Progressing: [] Met: [] Not Met: [] Adjusted    Long Term Goals: To be achieved in: 4 weeks  1. Disability index score of 10% or less for the [place] to assist with reaching prior level of function. [] Progressing: [x] Met: [] Not Met: [] Adjusted  2.  Patient will demonstrate increased LS AROM mobility to WNL to allow for proper joint functioning as indicated by patients Functional Deficits. [x] Progressing: [] Met: [] Not Met: [] Adjusted  3. Patient will demonstrate an increase in Strength of 4+/5 of BLE in order to allow for proper functional mobility as indicated by patients Functional Deficits. [x] Progressing: [] Met: [] Not Met: [] Adjusted  4. Patient will return to all functional activities without increased symptoms or restriction. [x] Progressing: [] Met: [] Not Met: [] Adjusted  5. Pt will be able to sit for one hour without increased symptoms or restriction. [x] Progressing: [] Met: [] Not Met: [] Adjusted     Overall Progression Towards Functional goals/ Treatment Progress Update:  [x] Patient is progressing as expected towards functional goals listed with increase in trunk mobility and lumbar joint mobility allowing for decrease in severity of symptoms and increase in function. She continues to have strength deficits and compensates into excessive lumbar extension with squatting causing continued pain with lifting at work. Based on improvement and continued symptoms she would benefit from more PT. 3/4/21    [] Progression is slowed due to complexities/Impairments listed. [] Progression has been slowed due to co-morbidities. [] Plan just implemented, too soon to assess goals progression <30days   [] Goals require adjustment due to lack of progress  [] Patient is not progressing as expected and requires additional follow up with physician  [] Other    Prognosis for POC: [x] Good [] Fair  [] Poor      Patient requires continued skilled intervention: [x] Yes  [] No    Treatment/Activity Tolerance:  [] Patient able to complete treatment  [] Patient limited by fatigue  [] Patient limited by pain     [] Patient limited by other medical complications  [] Other:   3/18 Fatigued with increase in intensity of treatment especially in standing but no adverse effects noted or reported.      Patient education:  3/12 Updated HEP   3/9 Updated HEP and reviewed activity modification in order to decrease severity of symptoms. 3/4 Updated HEP and reviewed importance of continuing to increase abdominal/ hip strength and core stabilization to improve functional movement with work related activities. 3/2 Instructed on importance of icing for symptom control   2/23 Updated HEP, stressed importance of consistency with HEP and precautions at work such as lifting heavy weights that could place increase stress on lumbar spine. 2/11: Educated pt on body mechanics while at work, & consistency of HEP. Updated HEP  2/3: Pt educated on deficits, anatomy of lumbar spine and pelvis, POC, HEP and importance of compliance, and proper body mechanics while lifting, pushing, pulling, and carrying objects at work. [address] [place] [address] code: [id number]     Prognosis: [] Good [] Fair  [] Poor    Patient Requires Follow-up: [x] Yes  [] No    PLAN: See eval  [] Continue per plan of care [] Alter current plan (see comments)  [x] Plan of care initiated [] Hold pending MD visit [] Discharge  3/18 Monitor symptoms and progress as tolerated with hip/ abdominal strengthening and core stabilization  3/4 Continue with PT 1-2x a week for 4 weeks to further increase hip/abdominal strength and core stabilization with functional tasks in order to decrease symptoms with work related activities such as lifting. Electronically signed by: Stoney Camargo PT, DPT      *If patient does not return for further follow ups after this date. Please consider this as the patients discharge from physical therapy.

## 2021-03-23 ENCOUNTER — HOSPITAL ENCOUNTER (OUTPATIENT)
Dept: PHYSICAL THERAPY | Age: 36
Setting detail: THERAPIES SERIES
Discharge: HOME | End: 2021-03-23
Payer: COMMERCIAL

## 2021-03-23 NOTE — FLOWSHEET NOTE
Physical Therapy  Cancellation/No-show Note  Patient Name:  Bhakti Whatley  :  1985   Date:  3/23/2021  Cancelled visits to date: 1  No-shows to date: 0    For today's appointment patient:  [x]  Cancelled  []  Rescheduled appointment  []  No-show     Reason given by patient:  []  Patient ill  []  Conflicting appointment  []  No transportation    []  Conflict with work  []  No reason given  []  Other:     Comments:  Family emergency     Electronically signed by:  Swati Contreras PT

## 2021-03-25 ENCOUNTER — HOSPITAL ENCOUNTER (OUTPATIENT)
Dept: PHYSICAL THERAPY | Age: 36
Setting detail: THERAPIES SERIES
Discharge: HOME | End: 2021-03-25
Payer: COMMERCIAL

## 2021-03-25 PROCEDURE — 97112 NEUROMUSCULAR REEDUCATION: CPT

## 2021-03-25 PROCEDURE — 97110 THERAPEUTIC EXERCISES: CPT

## 2021-03-25 NOTE — FLOWSHEET NOTE
The [facility], [address] [employer] [address] [place] [address], [address] [place] [address]  Phone: [phone]   Fax:     [phone]    Physical Therapy Daily Treatment Note  Date:  3/25/2021    Patient Name:  Sandro HeartB:  1985  MRN: [id number]  Restrictions/Precautions:    Medical/Treatment Diagnosis Information:  Diagnosis: M54.5, G89.29 (ICD-10-CM) - Chronic right-sided low back pain without sciatica  Treatment Diagnosis: M54.5 Lumbar pain  Insurance/Certification information:  PT Insurance Information: [facility] Plan  Physician Information:  Referring Practitioner: Humberto Truong MD  Has the plan of care been signed (Y/N):        [x]  Yes  []  No     Date of Patient follow up with Physician: Not scheduled yet      Is this a Progress Report:     []  Yes  [x]  No        If Yes:  Date Range for reporting period:  Beginnin/3/2021  Ending 3/4/21    Progress report will be due (10 Rx or 30 days whichever is less): 21      Recertification will be due (POC Duration  / 90 days whichever is less): 2021        Visit # Insurance Allowable Auth Required   11  3/25  [facility]  30 visits []  Yes [x]  No        Functional Scale:   STSRNA 3% deficit Date assessed: 3/4/21  [place] 22% deficit Date assessed:  2/3/2021     Latex Allergy:  []NO      []YES  Preferred Language for Healthcare:   [x]English       []other      Pain level:  3/25: 3/10      SUBJECTIVE:  3/25: Pt states she has been off the past 3 days from work. Has overall muscle soreness d/t working in yard but no increase in pain.        OBJECTIVE: Updated for progress note 3/4/21:  ROM   Comments   Trunk flexion 78 deg No pain   Trunk extension 16 deg No pain   Trunk R sidebend 20 cm     Trunk L sidebend 20 cm      Trunk R rotation WNL       Trunk L rotation WNL       HS flexibility                          Strength Left Right Comments   Hip flexion(L2) 4 4- mild R low back fatigue after standing hip abd and pallof press    Pallof press Green TB 3x10 bilat3/16 reintroduced d/t decrease in severity of symptoms at start of treatment    Standing hip abd 3x10 bilat^3/16 cues to maintain level hips                            Postural education   3/2 reviewed proper posture during exercises but did not spend significant time on treatment compared to last visit. Manual Intervention      R LE LAD   Start 2/3   STM  3/10 no signficant muscle guarding noted   Prone PA 3/10 withheld d/t significant tenderness R L3-L5   Manual lumbar traction  3/25 withheld d/t decrease in symptoms     Therapeutic Exercise and NMR EXR  [x] (18662) Provided verbal/tactile cueing for activities related to strengthening, flexibility, endurance, ROM  for improvements in proximal hip and core control with self care, mobility, lifting and ambulation. [x] (31987) Provided verbal/tactile cueing for activities related to improving balance, coordination, kinesthetic sense, posture, motor skill, proprioception  to assist with core control in self care, mobility, lifting, and ambulation.      Therapeutic Activities:    [] (07116 or 30762) Provided verbal/tactile cueing for activities related to improving balance, coordination, kinesthetic sense, posture, motor skill, proprioception and motor activation to allow for proper function  with self care and ADLs  [] (56443) Provided training and instruction to the patient for proper core and proximal hip recruitment and positioning with ambulation re-education     Home Exercise Program:    [x] (87175) Reviewed/Progressed HEP activities related to strengthening, flexibility, endurance, ROM of core, proximal hip and LE for functional self-care, mobility, lifting and ambulation   [] (31270) Reviewed/Progressed HEP activities related to improving balance, coordination, kinesthetic sense, posture, motor skill, proprioception of core, proximal hip and LE for self care, mobility, lifting, and ambulation      Manual Treatments:  PROM / STM / Oscillations-Mobs:  G-I, II, III, IV (PA's, Inf., Post.)  [x] (67273) Provided manual therapy to mobilize proximal hip and LS spine soft tissue/joints for the purpose of modulating pain, promoting relaxation,  increasing ROM, reducing/eliminating soft tissue swelling/inflammation/restriction, improving soft tissue extensibility and allowing for proper ROM for normal function with self care, mobility, lifting and ambulation. Modalities:  E-Stim withheld d/t decrease in symptoms 3/16     Charges:  Timed Code Treatment Minutes: 39'   Total Treatment Minutes: 10:18-11:08  50'       [] EVAL (LOW) 85306 (typically 20 minutes face-to-face)  [] EVAL (MOD) 84329 (typically 30 minutes face-to-face)  [] EVAL (HIGH) 13503 (typically 45 minutes face-to-face)  [] RE-EVAL     [x] SELMA(77932) x 2    [] IONTO  [x] NMR (35114) x 1    [] VASO  [] Manual (87538) x      [] Other:  [] TA x      [] Mech Traction (53799)  [] ES(attended) (05498)      [] ES (un) (16416):     Goals:   Patient stated goal: To be able to sit without back pain and being able to play with her kids on the floor again. [x] Progressing: [] Met: [] Not Met: [] Adjusted    Therapist goals for Patient:   Short Term Goals: To be achieved in: 2 weeks  1. Independent in HEP and progression per patient tolerance, in order to prevent re-injury. [x] Progressing: [] Met: [] Not Met: [] Adjusted   2. Patient will have a decrease in pain to facilitate improvement in movement, function, and ADLs as indicated by Functional Deficits. [x] Progressing: [] Met: [] Not Met: [] Adjusted    Long Term Goals: To be achieved in: 4 weeks  1. Disability index score of 10% or less for the [place] to assist with reaching prior level of function. [] Progressing: [x] Met: [] Not Met: [] Adjusted  2.  Patient will demonstrate increased LS AROM mobility to WNL to allow for proper joint functioning as indicated by patients Functional Deficits. [x] Progressing: [] Met: [] Not Met: [] Adjusted  3. Patient will demonstrate an increase in Strength of 4+/5 of BLE in order to allow for proper functional mobility as indicated by patients Functional Deficits. [x] Progressing: [] Met: [] Not Met: [] Adjusted  4. Patient will return to all functional activities without increased symptoms or restriction. [x] Progressing: [] Met: [] Not Met: [] Adjusted  5. Pt will be able to sit for one hour without increased symptoms or restriction. [x] Progressing: [] Met: [] Not Met: [] Adjusted     Overall Progression Towards Functional goals/ Treatment Progress Update:  [x] Patient is progressing as expected towards functional goals listed with increase in trunk mobility and lumbar joint mobility allowing for decrease in severity of symptoms and increase in function. She continues to have strength deficits and compensates into excessive lumbar extension with squatting causing continued pain with lifting at work. Based on improvement and continued symptoms she would benefit from more PT. 3/4/21    [] Progression is slowed due to complexities/Impairments listed. [] Progression has been slowed due to co-morbidities. [] Plan just implemented, too soon to assess goals progression <30days   [] Goals require adjustment due to lack of progress  [] Patient is not progressing as expected and requires additional follow up with physician  [] Other    Prognosis for POC: [x] Good [] Fair  [] Poor      Patient requires continued skilled intervention: [x] Yes  [] No    Treatment/Activity Tolerance:  [] Patient able to complete treatment  [] Patient limited by fatigue  [] Patient limited by pain     [] Patient limited by other medical complications  [] Other:   3/25 Decrease muscle guarding and tightness on R lumbar paraspinals. Mild tightness on L that responded well to stretches.  Withheld traction d/t decrease in symptoms which is potentially related to not working the past 3 days. Fatigued with increase in intensity of treatment and required cues to avoid lumbar compensation. Pt was very challenged with SLS with hip abduction in order to avoid hip drop and lumbar compensation. Form improved with cues and use of finger tip support     Patient education:  3/25 Updated HEP. Educated on proper hip abductor activation to decrease hip drop and lumbar compensation with activities in weight bearing. 3/12 Updated HEP   3/9 Updated HEP and reviewed activity modification in order to decrease severity of symptoms. 3/4 Updated HEP and reviewed importance of continuing to increase abdominal/ hip strength and core stabilization to improve functional movement with work related activities. 3/2 Instructed on importance of icing for symptom control   2/23 Updated HEP, stressed importance of consistency with HEP and precautions at work such as lifting heavy weights that could place increase stress on lumbar spine. 2/11: Educated pt on body mechanics while at work, & consistency of HEP. Updated HEP  2/3: Pt educated on deficits, anatomy of lumbar spine and pelvis, POC, HEP and importance of compliance, and proper body mechanics while lifting, pushing, pulling, and carrying objects at work. [address] [place] [address] code: [id number]     Prognosis: [] Good [] Fair  [] Poor    Patient Requires Follow-up: [x] Yes  [] No    PLAN: See eval  [] Continue per plan of care [] Alter current plan (see comments)  [x] Plan of care initiated [] Hold pending MD visit [] Discharge  3/25 Monitor symptoms and progress as tolerated with hip/ abdominal strengthening and core stabilization in weight bearing  3/4 Continue with PT 1-2x a week for 4 weeks to further increase hip/abdominal strength and core stabilization with functional tasks in order to decrease symptoms with work related activities such as lifting.      Electronically signed by: Elvia Llamas PT, DPT      *If patient does not return for further follow ups after this date. Please consider this as the patients discharge from physical therapy.

## 2021-03-30 ENCOUNTER — HOSPITAL ENCOUNTER (OUTPATIENT)
Dept: PHYSICAL THERAPY | Age: 36
Setting detail: THERAPIES SERIES
Discharge: HOME | End: 2021-03-30
Payer: COMMERCIAL

## 2021-03-30 PROCEDURE — 97112 NEUROMUSCULAR REEDUCATION: CPT

## 2021-03-30 PROCEDURE — 97110 THERAPEUTIC EXERCISES: CPT

## 2021-03-30 NOTE — FLOWSHEET NOTE
[facility], [address] [place] [address], [address] [place] [address]  Phone: [phone]   Fax:     [phone]    Physical Therapy Daily Treatment Note  Date:  3/30/2021    Patient Name:  Kyung Jackson    :  1985  MRN: [id number]  Restrictions/Precautions:    Medical/Treatment Diagnosis Information:  Diagnosis: M54.5, G89.29 (ICD-10-CM) - Chronic right-sided low back pain without sciatica  Treatment Diagnosis: M54.5 Lumbar pain  Insurance/Certification information:  PT Insurance Information: [facility] Plan  Physician Information:  Referring Practitioner: Taylor Helm MD  Has the plan of care been signed (Y/N):        [x]  Yes  []  No     Date of Patient follow up with Physician: Not scheduled yet      Is this a Progress Report:     []  Yes  [x]  No        If Yes:  Date Range for reporting period:  Beginnin/3/2021  Ending 3/4/21    Progress report will be due (10 Rx or 30 days whichever is less): 08      Recertification will be due (POC Duration  / 90 days whichever is less): 2021        Visit # Insurance Allowable Auth Required   12  3/30  [facility]  30 visits []  Yes [x]  No        Functional Scale:   AUHERI 3% deficit Date assessed: 3/4/21  Jonathon 22% deficit Date assessed:  2/3/2021     Latex Allergy:  []NO      []YES  Preferred Language for Healthcare:   [x]English       []other      Pain level:  3/30: 0/10      SUBJECTIVE:  3/30 Pt states things are improving with regards to symptoms at work. No issues after last visit.         OBJECTIVE: Updated for progress note 3/4/21:  ROM   Comments   Trunk flexion 78 deg No pain   Trunk extension 16 deg No pain   Trunk R sidebend 20 cm     Trunk L sidebend 20 cm      Trunk R rotation WNL       Trunk L rotation WNL       HS flexibility                          Strength Left Right Comments   Hip flexion(L2) 4 4- mild R low back pain     Knee extension(L3) 5 5   Knee flexion(S1-2) 5 5     Ankle dorsiflexion(L4) 5 5     Ankle eversion 5 5     Hip abd   4  4-         Squat: Excessive bilat forward knee flexion, depth WNL, bilat ER, pt stated R low back felt a pulling sensation      Joint mobility:               []? Normal mild end range restriction bilat with P-A assessment but no significant hypomobility noted               []? Hypo              []? Hyper     Palpation: Mild tenderness/ guarding R lumbar paraspinals     Functional Mobility/Transfers: Independent      Posture: In sitting, forward head, rounded, shoulders.  Standing WNL      Bandages/Dressings/Incisions: N/A     Gait: independent and WNL besides mild forefoot pronation and ankle eversion on R    RESTRICTIONS/PRECAUTIONS: N/A    Exercises/Interventions:   ROM/stretches     Hamstring stretch  30\"hx3 seated EOT ^3/25 cues required to avoid flexed lumbar spine    [facility]     DKTC  2/23 withheld d/t decrease stretch reported    Child's pose  3/10 withheld d/t no stretch in low back reported   Supine HS     Pelvic tilt + TA  2/11 progressed to TA supine march    Lateral trunk rotation on SB 3x10  3/10 reintroduced d/t symptoms    Sidely thoracic mobilization  10\"hx10 bilat  3/25 side lying, completed bilat d/t mild tightness L lumbar paraspinals after working in yard   Cat and camel  3/10 withheld d/t symptoms   Seated lumbar flexion  3/25   Strengthening     Dead bug 3x10 alt UE/LE 3/12   Oblique LTR 2x10 bilat 3/30 supine 90/90 with BS    Clams Polo loop 3x10 bilat ^3/25   Bridges SL 3x10 bilat ^3/30   TA with LE ext 3x10 bilat ^3/18 LE unsupported 90/90 positron, controlled ROM to decrease lumbar extension    Bird dog 3x10 alt UE/LE ^3/30    Seated marches on [place] SB  3/10 withheld d/t flare up in symptoms    TB Ext  3/25 withheld d/t fatigue after standing hip abd and pallof press    Pallof press 3/30 withheld d/t fatigeud    Standing hip abd Red TB3x10 bilat^3/30                             Postural education   3/2 reviewed proper posture during exercises but did not spend significant time on treatment compared to last visit. Manual Intervention      R LE LAD   Start 2/3   STM  3/10 no signficant muscle guarding noted   Prone PA 3/10 withheld d/t significant tenderness R L3-L5   Manual lumbar traction  3/25 withheld d/t decrease in symptoms     Therapeutic Exercise and NMR EXR  [x] (21683) Provided verbal/tactile cueing for activities related to strengthening, flexibility, endurance, ROM  for improvements in proximal hip and core control with self care, mobility, lifting and ambulation. [x] (49997) Provided verbal/tactile cueing for activities related to improving balance, coordination, kinesthetic sense, posture, motor skill, proprioception  to assist with core control in self care, mobility, lifting, and ambulation.      Therapeutic Activities:    [] (76433 or 41593) Provided verbal/tactile cueing for activities related to improving balance, coordination, kinesthetic sense, posture, motor skill, proprioception and motor activation to allow for proper function  with self care and ADLs  [] (96579) Provided training and instruction to the patient for proper core and proximal hip recruitment and positioning with ambulation re-education     Home Exercise Program:    [x] (66058) Reviewed/Progressed HEP activities related to strengthening, flexibility, endurance, ROM of core, proximal hip and LE for functional self-care, mobility, lifting and ambulation   [] (35171) Reviewed/Progressed HEP activities related to improving balance, coordination, kinesthetic sense, posture, motor skill, proprioception of core, proximal hip and LE for self care, mobility, lifting, and ambulation      Manual Treatments:  PROM / STM / Oscillations-Mobs:  G-I, II, III, IV (PA's, Inf., Post.)  [x] (02225) Provided manual therapy to mobilize proximal hip and LS spine soft tissue/joints for the purpose of modulating pain, promoting relaxation, increasing ROM, reducing/eliminating soft tissue swelling/inflammation/restriction, improving soft tissue extensibility and allowing for proper ROM for normal function with self care, mobility, lifting and ambulation. Modalities:      Charges:  Timed Code Treatment Minutes: 36'   Total Treatment Minutes: 11:00-11:42  43'       [] EVAL (LOW) 17457 (typically 20 minutes face-to-face)  [] EVAL (MOD) 81263 (typically 30 minutes face-to-face)  [] EVAL (HIGH) 93004 (typically 45 minutes face-to-face)  [] RE-EVAL     [x] WD(76241) x 2    [] IONTO  [x] NMR (55793) x 1    [] VASO  [] Manual (31347) x      [] Other:  [] TA x      [] Mech Traction (50170)  [] ES(attended) (86833)      [] ES (un) (07389):     Goals:   Patient stated goal: To be able to sit without back pain and being able to play with her kids on the floor again. [x] Progressing: [] Met: [] Not Met: [] Adjusted    Therapist goals for Patient:   Short Term Goals: To be achieved in: 2 weeks  1. Independent in HEP and progression per patient tolerance, in order to prevent re-injury. [x] Progressing: [] Met: [] Not Met: [] Adjusted   2. Patient will have a decrease in pain to facilitate improvement in movement, function, and ADLs as indicated by Functional Deficits. [x] Progressing: [] Met: [] Not Met: [] Adjusted    Long Term Goals: To be achieved in: 4 weeks  1. Disability index score of 10% or less for the [place] to assist with reaching prior level of function. [] Progressing: [x] Met: [] Not Met: [] Adjusted  2. Patient will demonstrate increased LS AROM mobility to WNL to allow for proper joint functioning as indicated by patients Functional Deficits. [x] Progressing: [] Met: [] Not Met: [] Adjusted  3. Patient will demonstrate an increase in Strength of 4+/5 of BLE in order to allow for proper functional mobility as indicated by patients Functional Deficits. [x] Progressing: [] Met: [] Not Met: [] Adjusted  4.  Patient will return to all movement with work related activities. 3/2 Instructed on importance of icing for symptom control   2/23 Updated HEP, stressed importance of consistency with HEP and precautions at work such as lifting heavy weights that could place increase stress on lumbar spine. 2/11: Educated pt on body mechanics while at work, & consistency of HEP. Updated HEP  2/3: Pt educated on deficits, anatomy of lumbar spine and pelvis, POC, HEP and importance of compliance, and proper body mechanics while lifting, pushing, pulling, and carrying objects at work. Antoinette Zhong code: [id number]     Prognosis: [] Good [] Fair  [] Poor    Patient Requires Follow-up: [x] Yes  [] No    PLAN: See eval  [] Continue per plan of care [] Alter current plan (see comments)  [x] Plan of care initiated [] Hold pending MD visit [] Discharge  3/30 Complete progress note for potential discharge based on subjective and objective improvements. 3/4 Continue with PT 1-2x a week for 4 weeks to further increase hip/abdominal strength and core stabilization with functional tasks in order to decrease symptoms with work related activities such as lifting. Electronically signed by: Mindi Kimbrough PT, DPT      *If patient does not return for further follow ups after this date. Please consider this as the patients discharge from physical therapy.

## 2021-04-01 ENCOUNTER — HOSPITAL ENCOUNTER (OUTPATIENT)
Dept: PHYSICAL THERAPY | Age: 36
Setting detail: THERAPIES SERIES
Discharge: HOME | End: 2021-04-01
Payer: COMMERCIAL

## 2021-04-01 PROCEDURE — 97112 NEUROMUSCULAR REEDUCATION: CPT

## 2021-04-01 PROCEDURE — 97110 THERAPEUTIC EXERCISES: CPT

## 2021-04-01 NOTE — PROGRESS NOTES
flexion(S1-2) 5 5     Ankle dorsiflexion(L4) 5 5     Ankle eversion 5 5     Hip abd   4  4+     Hip ext 4 4+ Lumbar compensation noted        Squat: WNL and no c/o pain     Joint mobility:               [x]? Normal Lumbar facets               []? Hypo              []? Hyper     Palpation: No tenderness or guarding at lumbar paraspinals      Functional Mobility/Transfers: Independent      Posture: In sitting, forward head, rounded, shoulders. Standing WNL      Bandages/Dressings/Incisions: N/A     Gait: independent and WNL besides mild forefoot pronation and ankle eversion on R    RESTRICTIONS/PRECAUTIONS: N/A    Exercises/Interventions:   ROM/stretches     Hamstring stretch  30\"hx3 seated EOT ^3/25 cues required to avoid flexed lumbar spine    [facility]     DKTC  2/23 withheld d/t decrease stretch reported    Child's pose  3/10 withheld d/t no stretch in low back reported   Supine HS     Pelvic tilt + TA  2/11 progressed to TA supine march    Lateral trunk rotation on SB 3x10  3/10 reintroduced d/t symptoms    Sidely thoracic mobilization  10\"hx10 bilat  3/25 side lying, completed bilat d/t mild tightness L lumbar paraspinals after working in yard   Cat and camel  3/10 withheld d/t symptoms   Seated lumbar flexion  3/25   Strengthening     Dead bug 3x10 alt UE/LE 3/12   Oblique LTR 2x10 bilat 3/30 supine 90/90 with BS    Clams Polo loop 3x10 bilat ^3/25   Bridges SL 3x10 bilat ^3/30   TA with LE ext 3x10 bilat ^3/18 LE unsupported 90/90 positron, controlled ROM to decrease lumbar extension    Bird dog 3x10 alt UE/LE ^3/30    Seated marches on [place] SB  3/10 withheld d/t flare up in symptoms    TB Ext  3/25 withheld d/t fatigue after standing hip abd and pallof press    Pallof press 3/30 withheld d/t fatigeud    Standing hip abd Red TB3x10 bilat^3/30                             Postural education   3/2 reviewed proper posture during exercises but did not spend significant time on treatment compared to last visit. and allowing for proper ROM for normal function with self care, mobility, lifting and ambulation. Modalities:      Charges:  Timed Code Treatment Minutes: 36'   Total Treatment Minutes: 11:00-11:50       [] EVAL (LOW) 99856 (typically 20 minutes face-to-face)  [] EVAL (MOD) 27961 (typically 30 minutes face-to-face)  [] EVAL (HIGH) 93822 (typically 45 minutes face-to-face)  [] RE-EVAL     [x] WE(73068) x 2    [] IONTO  [x] NMR (50786) x 1    [] VASO  [] Manual (46581) x      [] Other:  [] TA x      [] Mech Traction (87757)  [] ES(attended) (43353)      [] ES (un) (43994):     Goals:   Patient stated goal: To be able to sit without back pain and being able to play with her kids on the floor again. [x] Progressing: [] Met: [] Not Met: [] Adjusted    Therapist goals for Patient:   Short Term Goals: To be achieved in: 2 weeks  1. Independent in HEP and progression per patient tolerance, in order to prevent re-injury. [x] Progressing: [] Met: [] Not Met: [] Adjusted   2. Patient will have a decrease in pain to facilitate improvement in movement, function, and ADLs as indicated by Functional Deficits. [x] Progressing: [] Met: [] Not Met: [] Adjusted    Long Term Goals: To be achieved in: 4 weeks  1. Disability index score of 10% or less for the [place] to assist with reaching prior level of function. [] Progressing: [x] Met: [] Not Met: [] Adjusted  2. Patient will demonstrate increased LS AROM mobility to WNL to allow for proper joint functioning as indicated by patients Functional Deficits. [x] Progressing: [] Met: [] Not Met: [] Adjusted  3. Patient will demonstrate an increase in Strength of 4+/5 of BLE in order to allow for proper functional mobility as indicated by patients Functional Deficits. [x] Progressing: [] Met: [] Not Met: [] Adjusted  4. Patient will return to all functional activities without increased symptoms or restriction. [x] Progressing: [] Met: [] Not Met: [] Adjusted  5.  Pt will be able to sit for one hour without increased symptoms or restriction. [x] Progressing: [x] Met: [] Not Met: [] Adjusted     Overall Progression Towards Functional goals/ Treatment Progress Update:  [x] Patient is progressing as expected towards functional goals listed with increase in trunk mobility and lumbar joint mobility allowing for decrease in severity of symptoms and increase in function. Her strength has also improved but she continues to have strength deficits and compensates with lumbar paraspinals when activating gluts. Based on job duties and continued strength deficits she would be appropriate for continued PT. 4/1/21    [] Progression is slowed due to complexities/Impairments listed. [] Progression has been slowed due to co-morbidities. [] Plan just implemented, too soon to assess goals progression <30days   [] Goals require adjustment due to lack of progress  [] Patient is not progressing as expected and requires additional follow up with physician  [] Other    Prognosis for POC: [x] Good [] Fair  [] Poor      Patient requires continued skilled intervention: [x] Yes  [] No    Treatment/Activity Tolerance:  [] Patient able to complete treatment  [] Patient limited by fatigue  [] Patient limited by pain     [] Patient limited by other medical complications  [] Other:   4/1 Fatigued with increase in intensity of treatment but overall doing well with no adverse effects. Patient education:  3/25 Updated HEP. Educated on proper hip abductor activation to decrease hip drop and lumbar compensation with activities in weight bearing. 3/12 Updated HEP   3/9 Updated HEP and reviewed activity modification in order to decrease severity of symptoms. 3/4 Updated HEP and reviewed importance of continuing to increase abdominal/ hip strength and core stabilization to improve functional movement with work related activities.    3/2 Instructed on importance of icing for symptom control   2/23 Updated HEP, stressed importance of consistency with HEP and precautions at work such as lifting heavy weights that could place increase stress on lumbar spine. 2/11: Educated pt on body mechanics while at work, & consistency of HEP. Updated HEP  2/3: Pt educated on deficits, anatomy of lumbar spine and pelvis, POC, HEP and importance of compliance, and proper body mechanics while lifting, pushing, pulling, and carrying objects at work. [address] code: [id number]     Prognosis: [] Good [] Fair  [] Poor    Patient Requires Follow-up: [x] Yes  [] No    PLAN: See eval  [] Continue per plan of care [] Alter current plan (see comments)  [x] Plan of care initiated [] Hold pending MD visit [] Discharge  4/1 Based on overall progress, continued strength deficits and job duties she will continue with PT 1x a week for 4 weeks to further increase hip/abdominal strength in order to decrease risk of increase in symptoms with work related tasks. Electronically signed by: Pete Victor PT, DPT      *If patient does not return for further follow ups after this date. Please consider this as the patients discharge from physical therapy.

## 2021-04-09 RX ORDER — TRAZODONE HYDROCHLORIDE 100 MG/1
TABLET ORAL
Qty: 30 TABLET | Refills: 1 | Status: SHIPPED | OUTPATIENT
Start: 2021-04-09 | End: 2021-07-05 | Stop reason: SDUPTHER

## 2021-04-09 NOTE — TELEPHONE ENCOUNTER
Last OV 1/8/2021   Next OV Visit date not found    Requested Prescriptions     Pending Prescriptions Disp Refills    traZODone (DESYREL) 100 MG tablet [Pharmacy Med Name: traZODone 100 MG TABLET] 30 tablet 1     Sig: TAKE ONE TABLET BY MOUTH DAILY

## 2021-04-13 ENCOUNTER — HOSPITAL ENCOUNTER (OUTPATIENT)
Dept: PHYSICAL THERAPY | Age: 36
Setting detail: THERAPIES SERIES
Discharge: HOME | End: 2021-04-13
Payer: COMMERCIAL

## 2021-04-13 NOTE — FLOWSHEET NOTE
Physical Therapy  Cancellation/No-show Note  Patient Name:  Argenis Madrid  :  1985   Date:  2021  Cancelled visits to date: 1  No-shows to date: 1    For today's appointment patient:  []  Cancelled  []  Rescheduled appointment  [x]  No-show     Reason given by patient:  []  Patient ill  []  Conflicting appointment  []  No transportation    []  Conflict with work  [x]  No reason given  []  Other:     Comments:      Electronically signed by:  Alka Duran PT

## 2021-04-14 DIAGNOSIS — A60.00 GENITAL HERPES SIMPLEX, UNSPECIFIED SITE: ICD-10-CM

## 2021-04-14 RX ORDER — VALACYCLOVIR HYDROCHLORIDE 1 G/1
TABLET, FILM COATED ORAL
Qty: 10 TABLET | Refills: 1 | Status: SHIPPED | OUTPATIENT
Start: 2021-04-14 | End: 2021-06-15

## 2021-04-14 NOTE — TELEPHONE ENCOUNTER
Pt is requesting medication refill valACYclovir (VALTREX) 1 g tablet  [facility] [address] [place], [place]

## 2021-04-14 NOTE — TELEPHONE ENCOUNTER
Last OV 1/8/2021   Next OV Visit date not found    Requested Prescriptions     Pending Prescriptions Disp Refills    valACYclovir (VALTREX) 1 g tablet 10 tablet 1     Sig: Take one pill po bid x 5 days

## 2021-04-20 ENCOUNTER — HOSPITAL ENCOUNTER (OUTPATIENT)
Dept: PHYSICAL THERAPY | Age: 36
Setting detail: THERAPIES SERIES
Discharge: HOME | End: 2021-04-20
Payer: COMMERCIAL

## 2021-04-20 NOTE — FLOWSHEET NOTE
Physical Therapy  Cancellation/No-show Note  Patient Name:  Zion Gonzalez  :  1985   Date:  2021  Cancelled visits to date: 1  No-shows to date: 2    For today's appointment patient:  []  Cancelled  []  Rescheduled appointment  [x]  No-show     Reason given by patient:  []  Patient ill  []  Conflicting appointment  []  No transportation    []  Conflict with work  [x]  No reason given  []  Other:     Comments:      Electronically signed by:  Shearon Jeans, PT

## 2021-04-27 RX ORDER — SERTRALINE HYDROCHLORIDE 100 MG/1
150 TABLET, FILM COATED ORAL DAILY
Qty: 45 TABLET | Refills: 5 | Status: SHIPPED | OUTPATIENT
Start: 2021-04-27 | End: 2021-11-16

## 2021-06-15 ENCOUNTER — OFFICE VISIT (OUTPATIENT)
Dept: FAMILY MEDICINE CLINIC | Age: 36
End: 2021-06-15
Payer: COMMERCIAL

## 2021-06-15 VITALS
DIASTOLIC BLOOD PRESSURE: 82 MMHG | BODY MASS INDEX: 24.84 KG/M2 | WEIGHT: 135 LBS | SYSTOLIC BLOOD PRESSURE: 120 MMHG | HEIGHT: 62 IN | OXYGEN SATURATION: 98 % | HEART RATE: 76 BPM

## 2021-06-15 DIAGNOSIS — R30.0 DYSURIA: Primary | ICD-10-CM

## 2021-06-15 LAB
BILIRUBIN, POC: NEGATIVE
BLOOD URINE, POC: NEGATIVE
CLARITY, POC: NORMAL
COLOR, POC: YELLOW
GLUCOSE URINE, POC: NEGATIVE
KETONES, POC: NEGATIVE
LEUKOCYTE EST, POC: NEGATIVE
NITRITE, POC: NEGATIVE
PH, POC: 5.5
PROTEIN, POC: NEGATIVE
SPECIFIC GRAVITY, POC: >=1.03
UROBILINOGEN, POC: NORMAL

## 2021-06-15 PROCEDURE — 99213 OFFICE O/P EST LOW 20 MIN: CPT | Performed by: NURSE PRACTITIONER

## 2021-06-15 PROCEDURE — G8427 DOCREV CUR MEDS BY ELIG CLIN: HCPCS | Performed by: NURSE PRACTITIONER

## 2021-06-15 PROCEDURE — 81002 URINALYSIS NONAUTO W/O SCOPE: CPT | Performed by: NURSE PRACTITIONER

## 2021-06-15 PROCEDURE — G8420 CALC BMI NORM PARAMETERS: HCPCS | Performed by: NURSE PRACTITIONER

## 2021-06-15 PROCEDURE — 4004F PT TOBACCO SCREEN RCVD TLK: CPT | Performed by: NURSE PRACTITIONER

## 2021-06-15 RX ORDER — NAPROXEN 500 MG/1
500 TABLET ORAL 2 TIMES DAILY WITH MEALS
Qty: 20 TABLET | Refills: 0 | Status: SHIPPED | OUTPATIENT
Start: 2021-06-15 | End: 2021-11-03 | Stop reason: SDUPTHER

## 2021-06-15 SDOH — ECONOMIC STABILITY: FOOD INSECURITY: WITHIN THE PAST 12 MONTHS, THE FOOD YOU BOUGHT JUST DIDN'T LAST AND YOU DIDN'T HAVE MONEY TO GET MORE.: NEVER TRUE

## 2021-06-15 SDOH — ECONOMIC STABILITY: FOOD INSECURITY: WITHIN THE PAST 12 MONTHS, YOU WORRIED THAT YOUR FOOD WOULD RUN OUT BEFORE YOU GOT MONEY TO BUY MORE.: NEVER TRUE

## 2021-06-15 ASSESSMENT — SOCIAL DETERMINANTS OF HEALTH (SDOH): HOW HARD IS IT FOR YOU TO PAY FOR THE VERY BASICS LIKE FOOD, HOUSING, MEDICAL CARE, AND HEATING?: NOT HARD AT ALL

## 2021-06-15 NOTE — PROGRESS NOTES
Priyanka Coronado (:  1985) is a 39 y.o. female,Established patient, here for evaluation of the following chief complaint(s):  Urinary Tract Infection (Patient complains of burning, blood in urine - started on Saturday. Has been taking OTC Azo. )         ASSESSMENT/PLAN:  1. Dysuria  Stable;  UA negative. Will send for a culture. Continue to drink plenty of fluids.  -     POCT Urinalysis no Micro  -     Culture, Urine      Return if symptoms worsen or fail to improve. Subjective   SUBJECTIVE/OBJECTIVE:  HPI  Symptoms started on Saturday  Left a tampon in x 1 week- did not realize she did not take it out  4 days after- had vaginal discharge  Felt ok after removal  Saturday- felt burning- started on Azo  Saw blood  Not really having dysuria  Denies urgency and frequency  Denies flank pain or abdominal pain  No fevers or chills  Trying to drink fluids- gatorade    Review of Systems       Objective   Physical Exam  Vitals reviewed. Constitutional:       Appearance: Normal appearance. HENT:      Head: Normocephalic. Right Ear: External ear normal.      Left Ear: External ear normal.   Cardiovascular:      Rate and Rhythm: Normal rate and regular rhythm. Pulses: Normal pulses. Heart sounds: Normal heart sounds, S1 normal and S2 normal.   Pulmonary:      Effort: Pulmonary effort is normal.      Breath sounds: Normal breath sounds and air entry. Abdominal:      Palpations: Abdomen is soft. Tenderness: There is no abdominal tenderness. There is no right CVA tenderness or left CVA tenderness. Neurological:      Mental Status: She is alert. Psychiatric:         Mood and Affect: Mood normal.            An electronic signature was used to authenticate this note.     --GABRIEL Rodriguez - CNP

## 2021-06-16 LAB — URINE CULTURE, ROUTINE: NORMAL

## 2021-07-06 RX ORDER — TRAZODONE HYDROCHLORIDE 100 MG/1
100 TABLET ORAL DAILY
Qty: 30 TABLET | Refills: 1 | Status: SHIPPED | OUTPATIENT
Start: 2021-07-06 | End: 2021-09-15

## 2021-07-06 NOTE — TELEPHONE ENCOUNTER
Medication:   Requested Prescriptions     Pending Prescriptions Disp Refills    traZODone (DESYREL) 100 MG tablet 30 tablet 1     Sig: Take 1 tablet by mouth daily        Last Filled:  4/9/21  Last appt: 6/15/2021   Next appt: NONE  Last OARRS:   RX Monitoring 2/18/2020   Periodic Controlled Substance Monitoring Possible medication side effects, risk of tolerance/dependence & alternative treatments discussed. ;No signs of potential drug abuse or diversion identified.

## 2021-09-14 ENCOUNTER — NURSE TRIAGE (OUTPATIENT)
Dept: OTHER | Facility: CLINIC | Age: 36
End: 2021-09-14

## 2021-09-14 NOTE — TELEPHONE ENCOUNTER
Reason for Disposition   SEVERE pain (e.g., excruciating, unable to walk) and not improved after 2 hours of pain medicine    Answer Assessment - Initial Assessment Questions  1. ONSET: \"When did the pain start? \"       Immediately after falling    2. LOCATION: \"Where is the pain located? \"       Behind ankle bone towards the heel, very swollen. 3. PAIN: \"How bad is the pain? \"    (Scale 1-10; or mild, moderate, severe)   - MILD (1-3): doesn't interfere with normal activities    - MODERATE (4-7): interferes with normal activities (e.g., work or school) or awakens from sleep, limping    - SEVERE (8-10): excruciating pain, unable to do any normal activities, unable to walk       8.5/10    4. WORK OR EXERCISE: \"Has there been any recent work or exercise that involved this part of the body? \"      Jerone Spillers over a tree stump    5. CAUSE: \"What do you think is causing the ankle pain? \"      Hiking and tripped on a stump and fell on ankle- [place] two pops when falling    6. OTHER SYMPTOMS: \"Do you have any other symptoms? \" (e.g., calf pain, rash, fever, swelling)      Swelling, bruising- turning Purple, tingling    7. PREGNANCY: \"Is there any chance you are pregnant? \" \"When was your last menstrual period? \"      Towards the end of cycle    Protocols used: ANKLE PAIN-ADULT-OH    Received call from Buck Montoya at [facility] with Red Flag Complaint. Brief description of triage: See above. Triage indicates for patient to be seen today. Care advice provided, patient verbalizes understanding; denies any other questions or concerns; instructed to call back for any new or worsening symptoms. Writer provided warm transfer to [place] at [facility] for appointment scheduling. Attention Provider: Thank you for allowing me to participate in the care of your patient. The patient was connected to triage in response to information provided to the [facility].   Please do not respond through this encounter as the response is not directed to a shared pool.

## 2021-09-15 RX ORDER — TRAZODONE HYDROCHLORIDE 100 MG/1
TABLET ORAL
Qty: 30 TABLET | Refills: 1 | Status: SHIPPED | OUTPATIENT
Start: 2021-09-15 | End: 2021-11-16

## 2021-09-15 NOTE — TELEPHONE ENCOUNTER
Trazodone 100 mg  Last OV 6/15/2021   Next OV Visit date not found  Last [facility] 07/06/2021    Katiana #[id number]

## 2021-11-03 ENCOUNTER — OFFICE VISIT (OUTPATIENT)
Dept: FAMILY MEDICINE CLINIC | Age: 36
End: 2021-11-03
Payer: COMMERCIAL

## 2021-11-03 VITALS
RESPIRATION RATE: 16 BRPM | WEIGHT: 129.6 LBS | DIASTOLIC BLOOD PRESSURE: 88 MMHG | TEMPERATURE: 98.2 F | SYSTOLIC BLOOD PRESSURE: 106 MMHG | OXYGEN SATURATION: 97 % | BODY MASS INDEX: 23.7 KG/M2 | HEART RATE: 82 BPM

## 2021-11-03 DIAGNOSIS — R59.0 AXILLARY LYMPHADENOPATHY: Primary | ICD-10-CM

## 2021-11-03 PROCEDURE — 99214 OFFICE O/P EST MOD 30 MIN: CPT | Performed by: FAMILY MEDICINE

## 2021-11-03 PROCEDURE — G8484 FLU IMMUNIZE NO ADMIN: HCPCS | Performed by: FAMILY MEDICINE

## 2021-11-03 PROCEDURE — G8420 CALC BMI NORM PARAMETERS: HCPCS | Performed by: FAMILY MEDICINE

## 2021-11-03 PROCEDURE — 4004F PT TOBACCO SCREEN RCVD TLK: CPT | Performed by: FAMILY MEDICINE

## 2021-11-03 PROCEDURE — G8427 DOCREV CUR MEDS BY ELIG CLIN: HCPCS | Performed by: FAMILY MEDICINE

## 2021-11-03 RX ORDER — NAPROXEN 500 MG/1
500 TABLET ORAL 2 TIMES DAILY WITH MEALS
Qty: 30 TABLET | Refills: 0 | Status: SHIPPED | OUTPATIENT
Start: 2021-11-03 | End: 2022-02-07

## 2021-11-03 ASSESSMENT — ENCOUNTER SYMPTOMS
DIARRHEA: 0
CHEST TIGHTNESS: 0
ABDOMINAL PAIN: 0
RHINORRHEA: 0
CONSTIPATION: 0
SHORTNESS OF BREATH: 0
SORE THROAT: 0

## 2021-11-03 NOTE — LETTER
Bacterial Vaginosis: Care Instructions  Your Care Instructions    Bacterial vaginosis is a type of vaginal infection. It is caused by excess growth of certain bacteria that are normally found in the vagina. Symptoms can include itching, swelling, pain when you urinate or have sex, and a gray or yellow discharge with a \"fishy\" odor. It is not considered an infection that is spread through sexual contact. Although symptoms can be annoying and uncomfortable, bacterial vaginosis does not usually cause other health problems. However, if you have it while you are pregnant, it can cause complications. While the infection may go away on its own, most doctors use antibiotics to treat it. You may have been prescribed pills or vaginal cream. With treatment, bacterial vaginosis usually clears up in 5 to 7 days. Follow-up care is a key part of your treatment and safety. Be sure to make and go to all appointments, and call your doctor if you are having problems. It's also a good idea to know your test results and keep a list of the medicines you take. How can you care for yourself at home? · Take your antibiotics as directed. Do not stop taking them just because you feel better. You need to take the full course of antibiotics. · Do not eat or drink anything that contains alcohol if you are taking metronidazole (Flagyl). · Keep using your medicine if you start your period. Use pads instead of tampons while using a vaginal cream or suppository. Tampons can absorb the medicine. · Wear loose cotton clothing. Do not wear nylon and other materials that hold body heat and moisture close to the skin. · Do not scratch. Relieve itching with a cold pack or a cool bath. · Do not wash your vaginal area more than once a day. Use plain water or a mild, unscented soap. Do not douche. When should you call for help?   Watch closely for changes in your health, and be sure to contact your doctor if:    · You have unexpected vaginal [facility]  [address] [place] [address],[address]  Phone: [phone]  Fax: [phone]    Darryl Dan MD        November 3, 2021     Patient: Hugo Hopkins   YOB: 1985   Date of Visit: 11/3/2021       To Whom It May Concern: It is my medical opinion that Lindsay Pierce may return to work on 11/05/2021. If you have any questions or concerns, please don't hesitate to call.     Sincerely,    Darryl Dan MD bleeding.     · You have a fever.     · You have new or increased pain in your vagina or pelvis.     · You are not getting better after 1 week.     · Your symptoms return after you finish the course of your medicine. Where can you learn more? Go to http://yan-amarilis.info/. Gerhardt Bunting in the search box to learn more about \"Bacterial Vaginosis: Care Instructions. \"  Current as of: February 19, 2019  Content Version: 12.1  © 7582-0345 [employer]. Care instructions adapted under license by [employer] (which disclaims liability or warranty for this information). If you have questions about a medical condition or this instruction, always ask your healthcare professional. [address] any warranty or liability for your use of this information.

## 2021-11-03 NOTE — PROGRESS NOTES
Subjective:   Patient ID: Julia Helms is a 39 y.o. female. HPI by clinical support staff:   Chief Complaint   Patient presents with    Mass     had booster Moderna vaccine on monday evening in left arm, had fever, chills, aches, now has lump under left armpit       Preliminary data above this line collected by clinical support staff.    ______________________________________________________________________  HPI by Provider:   HPI   Patient presents today with complaint of left axillary swelling and pain. States that she got her [place] booster 2 days ago. States that the pain is excruciating has been improving since yesterday. Temperature max 100.3 has been alternating Tylenol and ibuprofen. Has also tried a heating pad which has been helpful. 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 Data above this line collected by Provider. Patient's medications, allergies, past medical, surgical, social and family histories were reviewed and updated as appropriate. Patient Care Team:  Wilfrid Lopez MD as PCP - General (Family Medicine)  Wilfrid Lopez MD as PCP - [facility] Empaneled Provider    Current Outpatient Medications on File Prior to Visit   Medication Sig Dispense Refill    traZODone (DESYREL) 100 MG tablet TAKE ONE TABLET BY MOUTH DAILY 30 tablet 1    sertraline (ZOLOFT) 100 MG tablet Take 1.5 tablets by mouth daily 45 tablet 5    albuterol sulfate HFA (VENTOLIN HFA) 108 (90 Base) MCG/ACT inhaler Inhale 2 puffs into the lungs every 4-6 hours as needed for Wheezing 1 Inhaler 1     No current facility-administered medications on file prior to visit. Review of Systems   Constitutional: Positive for fever. Negative for activity change, appetite change and fatigue. HENT: Negative for congestion, rhinorrhea and sore throat. Respiratory: Negative for chest tightness and shortness of breath.     Cardiovascular: Negative for chest pain, palpitations and leg swelling. Gastrointestinal: Negative for abdominal pain, constipation and diarrhea. Genitourinary: Negative for dysuria and frequency. Musculoskeletal: Positive for myalgias. Negative for arthralgias. Neurological: Negative for dizziness, weakness and headaches. Psychiatric/Behavioral: Negative for hallucinations. All other systems reviewed and are negative. ROS above this line reviewed by Provider. Objective:   /88   Pulse 82   Temp 98.2 °F (36.8 °C) (Tympanic)   Resp 16   Wt 129 lb 9.6 oz (58.8 kg)   LMP 11/03/2021   SpO2 97%   Breastfeeding No   BMI 23.70 kg/m²   Physical Exam  Vitals and nursing note reviewed. Constitutional:       General: She is not in acute distress. Appearance: Normal appearance. She is normal weight. She is not ill-appearing, toxic-appearing or diaphoretic. HENT:      Head: Normocephalic and atraumatic. Eyes:      General: No scleral icterus. Conjunctiva/sclera: Conjunctivae normal.   Cardiovascular:      Rate and Rhythm: Normal rate and regular rhythm. Heart sounds: Normal heart sounds. No murmur heard. No friction rub. No gallop. Pulmonary:      Effort: Pulmonary effort is normal. No respiratory distress. Breath sounds: Normal breath sounds. No stridor. No wheezing, rhonchi or rales. Musculoskeletal:      Cervical back: Normal range of motion. Lymphadenopathy:      Upper Body:      Left upper body: Axillary adenopathy (tedner nonmobile mass- 1inch ) present. Skin:     General: Skin is warm and dry. Neurological:      Mental Status: She is alert. Psychiatric:         Mood and Affect: Mood normal.         Behavior: Behavior normal.       Assessment and Plan:   1. Axillary lymphadenopathy  Secondary to immune response from vaccine- reassured NSAID follow up  If not resolved in 2 weeks. - naproxen (NAPROSYN) 500 MG tablet;  Take 1 tablet by mouth 2 times daily (with meals)  Dispense: 30 tablet; Refill: 0       This chart note was prepared using a voice recognition dictation program. This note was reviewed for accuracy; however, addition, deletion and sound-alike word errors may occur. If there are any questions regarding this chart note, please contact the originating provider. Electronically signed by   Ezio Ojeda MD  11/3/2021   12:10 PM    Return if symptoms worsen or fail to improve.

## 2021-11-16 RX ORDER — SERTRALINE HYDROCHLORIDE 100 MG/1
TABLET, FILM COATED ORAL
Qty: 45 TABLET | Refills: 5 | Status: SHIPPED | OUTPATIENT
Start: 2021-11-16 | End: 2022-04-26 | Stop reason: SDUPTHER

## 2021-11-16 RX ORDER — TRAZODONE HYDROCHLORIDE 100 MG/1
TABLET ORAL
Qty: 30 TABLET | Refills: 1 | Status: SHIPPED | OUTPATIENT
Start: 2021-11-16 | End: 2022-01-31

## 2021-11-16 NOTE — TELEPHONE ENCOUNTER
Medication:   Requested Prescriptions     Pending Prescriptions Disp Refills    traZODone (DESYREL) 100 MG tablet [Pharmacy Med Name: traZODone 100 MG TABLET] 30 tablet 1     Sig: TAKE ONE TABLET BY MOUTH DAILY        Last Filled:  09/15/2021    Patient Phone Number: [phone] (home)     Last appt: 11/3/2021   Next appt: Visit date not found    Last OARRS:   RX Monitoring 2/18/2020   Periodic Controlled Substance Monitoring Possible medication side effects, risk of tolerance/dependence & alternative treatments discussed. ;No signs of potential drug abuse or diversion identified.

## 2022-01-03 DIAGNOSIS — A60.00 GENITAL HERPES SIMPLEX, UNSPECIFIED SITE: ICD-10-CM

## 2022-01-03 NOTE — TELEPHONE ENCOUNTER
Valtrex 1 G  Last OV 11/3/2021   Next OV Visit date not found  Last [facility] 08/27/2020

## 2022-01-03 NOTE — TELEPHONE ENCOUNTER
Medication:   Requested Prescriptions     Pending Prescriptions Disp Refills    valACYclovir (VALTREX) 1 g tablet 10 tablet 1     Sig: Take one pill po bid x 5 days        Last Filled:  04/14/2021 # 10     Patient Phone Number: [phone] (home)     Last appt: 11/3/2021   Next appt: Visit date not found    Last OARRS:   RX Monitoring 2/18/2020   Periodic Controlled Substance Monitoring Possible medication side effects, risk of tolerance/dependence & alternative treatments discussed. ;No signs of potential drug abuse or diversion identified.

## 2022-01-04 RX ORDER — VALACYCLOVIR HYDROCHLORIDE 1 G/1
TABLET, FILM COATED ORAL
Qty: 10 TABLET | Refills: 1 | Status: SHIPPED | OUTPATIENT
Start: 2022-01-04

## 2022-01-31 RX ORDER — TRAZODONE HYDROCHLORIDE 100 MG/1
TABLET ORAL
Qty: 30 TABLET | Refills: 1 | Status: SHIPPED | OUTPATIENT
Start: 2022-01-31 | End: 2022-04-13

## 2022-01-31 NOTE — TELEPHONE ENCOUNTER
Medication:   Requested Prescriptions     Pending Prescriptions Disp Refills    traZODone (DESYREL) 100 MG tablet [Pharmacy Med Name: traZODone 100 MG TABLET] 30 tablet 1     Sig: TAKE ONE TABLET BY MOUTH DAILY        Last Filled:  11/16/2021    Patient Phone Number: [phone] (home)     Last appt: 11/3/2021   Next appt: Visit date not found    Last OARRS:   RX Monitoring 2/18/2020   Periodic Controlled Substance Monitoring Possible medication side effects, risk of tolerance/dependence & alternative treatments discussed. ;No signs of potential drug abuse or diversion identified.

## 2022-02-05 DIAGNOSIS — R59.0 AXILLARY LYMPHADENOPATHY: ICD-10-CM

## 2022-02-07 RX ORDER — NAPROXEN 500 MG/1
TABLET ORAL
Qty: 30 TABLET | Refills: 0 | Status: SHIPPED | OUTPATIENT
Start: 2022-02-07 | End: 2022-05-16 | Stop reason: SDUPTHER

## 2022-02-07 NOTE — TELEPHONE ENCOUNTER
Medication:   Requested Prescriptions     Pending Prescriptions Disp Refills    naproxen (NAPROSYN) 500 MG tablet [Pharmacy Med Name: NAPROXEN 500 MG TABLET] 30 tablet 0     Sig: TAKE ONE TABLET BY MOUTH TWICE A DAY WITH MEALS        Last Filled:  11/03/2021    Patient Phone Number: [phone] (home)     Last appt: 11/3/2021   Next appt: Visit date not found    Last OARRS:   RX Monitoring 2/18/2020   Periodic Controlled Substance Monitoring Possible medication side effects, risk of tolerance/dependence & alternative treatments discussed. ;No signs of potential drug abuse or diversion identified.

## 2022-04-12 NOTE — TELEPHONE ENCOUNTER
Medication:   Requested Prescriptions     Pending Prescriptions Disp Refills    traZODone (DESYREL) 100 MG tablet [Pharmacy Med Name: traZODone 100 MG TABLET] 30 tablet 1     Sig: TAKE ONE TABLET BY MOUTH DAILY        Last Filled: 1/31/2022   Last appt: 11/3/2021   Next appt: 4/26/2022

## 2022-04-13 RX ORDER — TRAZODONE HYDROCHLORIDE 100 MG/1
TABLET ORAL
Qty: 30 TABLET | Refills: 1 | Status: SHIPPED | OUTPATIENT
Start: 2022-04-13 | End: 2022-04-26 | Stop reason: SDUPTHER

## 2022-04-26 ENCOUNTER — OFFICE VISIT (OUTPATIENT)
Dept: FAMILY MEDICINE CLINIC | Age: 37
End: 2022-04-26
Payer: COMMERCIAL

## 2022-04-26 VITALS
WEIGHT: 134 LBS | OXYGEN SATURATION: 98 % | HEART RATE: 72 BPM | HEIGHT: 62 IN | BODY MASS INDEX: 24.66 KG/M2 | DIASTOLIC BLOOD PRESSURE: 82 MMHG | SYSTOLIC BLOOD PRESSURE: 118 MMHG

## 2022-04-26 DIAGNOSIS — R06.2 WHEEZING: ICD-10-CM

## 2022-04-26 DIAGNOSIS — Z00.00 WELL ADULT EXAM: ICD-10-CM

## 2022-04-26 DIAGNOSIS — F33.1 MODERATE EPISODE OF RECURRENT MAJOR DEPRESSIVE DISORDER (HCC): ICD-10-CM

## 2022-04-26 DIAGNOSIS — Z00.00 WELL ADULT EXAM: Primary | ICD-10-CM

## 2022-04-26 LAB
A/G RATIO: 1.9 (ref 1.1–2.2)
ALBUMIN SERPL-MCNC: 4.9 G/DL (ref 3.4–5)
ALP BLD-CCNC: 49 U/L (ref 40–129)
ALT SERPL-CCNC: 15 U/L (ref 10–40)
ANION GAP SERPL CALCULATED.3IONS-SCNC: 14 MMOL/L (ref 3–16)
AST SERPL-CCNC: 23 U/L (ref 15–37)
BILIRUB SERPL-MCNC: 0.3 MG/DL (ref 0–1)
BUN BLDV-MCNC: 14 MG/DL (ref 7–20)
CALCIUM SERPL-MCNC: 9.7 MG/DL (ref 8.3–10.6)
CHLORIDE BLD-SCNC: 104 MMOL/L (ref 99–110)
CHOLESTEROL, TOTAL: 197 MG/DL (ref 0–199)
CO2: 23 MMOL/L (ref 21–32)
CREAT SERPL-MCNC: 0.6 MG/DL (ref 0.6–1.1)
GFR AFRICAN AMERICAN: >60
GFR NON-AFRICAN AMERICAN: >60
GLUCOSE BLD-MCNC: 91 MG/DL (ref 70–99)
HCT VFR BLD CALC: 40.4 % (ref 36–48)
HDLC SERPL-MCNC: 101 MG/DL (ref 40–60)
HEMOGLOBIN: 13.4 G/DL (ref 12–16)
LDL CHOLESTEROL CALCULATED: 83 MG/DL
MCH RBC QN AUTO: 30.4 PG (ref 26–34)
MCHC RBC AUTO-ENTMCNC: 33.1 G/DL (ref 31–36)
MCV RBC AUTO: 91.8 FL (ref 80–100)
PDW BLD-RTO: 13.2 % (ref 12.4–15.4)
PLATELET # BLD: 295 K/UL (ref 135–450)
PMV BLD AUTO: 8.1 FL (ref 5–10.5)
POTASSIUM SERPL-SCNC: 4.7 MMOL/L (ref 3.5–5.1)
RBC # BLD: 4.4 M/UL (ref 4–5.2)
SODIUM BLD-SCNC: 141 MMOL/L (ref 136–145)
TOTAL PROTEIN: 7.5 G/DL (ref 6.4–8.2)
TRIGL SERPL-MCNC: 64 MG/DL (ref 0–150)
TSH REFLEX: 2.21 UIU/ML (ref 0.27–4.2)
VLDLC SERPL CALC-MCNC: 13 MG/DL
WBC # BLD: 4.1 K/UL (ref 4–11)

## 2022-04-26 PROCEDURE — 99395 PREV VISIT EST AGE 18-39: CPT | Performed by: FAMILY MEDICINE

## 2022-04-26 RX ORDER — SERTRALINE HYDROCHLORIDE 100 MG/1
TABLET, FILM COATED ORAL
Qty: 135 TABLET | Refills: 1 | Status: SHIPPED | OUTPATIENT
Start: 2022-04-26

## 2022-04-26 RX ORDER — TRAZODONE HYDROCHLORIDE 100 MG/1
100 TABLET ORAL NIGHTLY
Qty: 90 TABLET | Refills: 1 | Status: SHIPPED | OUTPATIENT
Start: 2022-04-26

## 2022-04-26 RX ORDER — ALBUTEROL SULFATE 90 UG/1
2 AEROSOL, METERED RESPIRATORY (INHALATION)
Qty: 1 EACH | Refills: 2 | Status: SHIPPED | OUTPATIENT
Start: 2022-04-26

## 2022-04-26 RX ORDER — PREDNISONE 10 MG/1
50 TABLET ORAL DAILY
Qty: 25 TABLET | Refills: 0 | Status: SHIPPED | OUTPATIENT
Start: 2022-04-26 | End: 2022-05-01

## 2022-04-26 ASSESSMENT — ENCOUNTER SYMPTOMS: RESPIRATORY NEGATIVE: 1

## 2022-04-26 ASSESSMENT — PATIENT HEALTH QUESTIONNAIRE - PHQ9
8. MOVING OR SPEAKING SO SLOWLY THAT OTHER PEOPLE COULD HAVE NOTICED. OR THE OPPOSITE, BEING SO FIGETY OR RESTLESS THAT YOU HAVE BEEN MOVING AROUND A LOT MORE THAN USUAL: 0
6. FEELING BAD ABOUT YOURSELF - OR THAT YOU ARE A FAILURE OR HAVE LET YOURSELF OR YOUR FAMILY DOWN: 0
7. TROUBLE CONCENTRATING ON THINGS, SUCH AS READING THE NEWSPAPER OR WATCHING TELEVISION: 0
2. FEELING DOWN, DEPRESSED OR HOPELESS: 0
SUM OF ALL RESPONSES TO PHQ9 QUESTIONS 1 & 2: 2
1. LITTLE INTEREST OR PLEASURE IN DOING THINGS: 2
SUM OF ALL RESPONSES TO PHQ QUESTIONS 1-9: 4
3. TROUBLE FALLING OR STAYING ASLEEP: 0
9. THOUGHTS THAT YOU WOULD BE BETTER OFF DEAD, OR OF HURTING YOURSELF: 0
SUM OF ALL RESPONSES TO PHQ QUESTIONS 1-9: 4
4. FEELING TIRED OR HAVING LITTLE ENERGY: 2
5. POOR APPETITE OR OVEREATING: 0
SUM OF ALL RESPONSES TO PHQ QUESTIONS 1-9: 4
SUM OF ALL RESPONSES TO PHQ QUESTIONS 1-9: 4
10. IF YOU CHECKED OFF ANY PROBLEMS, HOW DIFFICULT HAVE THESE PROBLEMS MADE IT FOR YOU TO DO YOUR WORK, TAKE CARE OF THINGS AT HOME, OR GET ALONG WITH OTHER PEOPLE: 1

## 2022-04-26 NOTE — PROGRESS NOTES
Priyanka Salinas (:  1985) is a 44 y.o. female,Established patient, here for evaluation of the following chief complaint(s): Annual Exam         ASSESSMENT/PLAN:  Priyanka was seen today for annual exam.    Diagnoses and all orders for this visit:    Well adult exam  -     Lipid Panel; Future  -     Comprehensive Metabolic Panel; Future  -     CBC; Future  -     TSH with Reflex; Future  Good diet and activity  Moderate episode of recurrent major depressive disorder (HCC)  Stable on zoloft and trazadone  Wheezing  Trial of prednisone  Advised to call back directly if there are further questions, or if these symptoms fail to improve as anticipated or worsen. Medication side affects and adverse reactions reviewed. -     sertraline (ZOLOFT) 100 MG tablet; TAKE 1 AND 1/2 TABLET BY MOUTH DAILY  -     traZODone (DESYREL) 100 MG tablet; Take 1 tablet by mouth nightly         No follow-ups on file. Subjective   SUBJECTIVE/OBJECTIVE:  HPI   Pt is a of 44 y.o. female comes in today with   Chief Complaint   Patient presents with    Annual Exam     Has been tired. Trazodone helps  Getting enough sleep. Still tired at the end of the day. Vitals:    22 0931   BP: 118/82   Site: Left Upper Arm   Position: Sitting   Cuff Size: Small Adult   Pulse: 72   SpO2: 98%   Weight: 134 lb (60.8 kg)   Height: 5' 2\" (1.575 m)       Past Medical History:Reviewed  Medications:Reviewed. No Known Allergies   Social hx:Reviewed. Social History     Tobacco Use   Smoking Status Current Every Day Smoker    Packs/day: 0.35    Years: 20.00    Pack years: 7.00    Start date: 2000   Smokeless Tobacco Never Used       Review of Systems   Constitutional: Negative. Respiratory: Negative. Cardiovascular: Negative. Psychiatric/Behavioral: Negative. Objective   Physical Exam  Constitutional:       Appearance: Normal appearance. She is well-developed. HENT:      Head: Normocephalic.    Eyes:      General: No scleral icterus. Conjunctiva/sclera: Conjunctivae normal.   Neck:      Thyroid: No thyromegaly. Cardiovascular:      Rate and Rhythm: Normal rate. Heart sounds: Normal heart sounds. No murmur heard. No gallop. Pulmonary:      Effort: Pulmonary effort is normal.      Breath sounds: Normal breath sounds. No wheezing. Chest:   Breasts:      Right: No supraclavicular adenopathy. Left: No supraclavicular adenopathy. Abdominal:      General: There is no distension. Palpations: Abdomen is soft. There is no hepatomegaly or splenomegaly. Tenderness: There is no abdominal tenderness. Musculoskeletal:      Cervical back: Normal range of motion and neck supple. Lymphadenopathy:      Head:      Right side of head: No submandibular adenopathy. Left side of head: No submandibular adenopathy. Cervical: No cervical adenopathy. Upper Body:      Right upper body: No supraclavicular adenopathy. Left upper body: No supraclavicular adenopathy. Skin:     General: Skin is warm and dry. Nails: There is no clubbing. Neurological:      Mental Status: She is alert and oriented to person, place, and time. Cranial Nerves: No cranial nerve deficit. Deep Tendon Reflexes:      Reflex Scores:       Bicep reflexes are 2+ on the right side and 2+ on the left side. Patellar reflexes are 2+ on the right side and 2+ on the left side. Psychiatric:         Behavior: Behavior normal.              An electronic signature was used to authenticate this note.     --Armaan Hoover MD

## 2022-05-16 DIAGNOSIS — R59.0 AXILLARY LYMPHADENOPATHY: ICD-10-CM

## 2022-05-16 RX ORDER — NAPROXEN 500 MG/1
TABLET ORAL
Qty: 30 TABLET | Refills: 0 | Status: SHIPPED | OUTPATIENT
Start: 2022-05-16 | End: 2022-09-14 | Stop reason: SDUPTHER

## 2022-05-16 NOTE — TELEPHONE ENCOUNTER
Medication:   Requested Prescriptions     Pending Prescriptions Disp Refills    naproxen (NAPROSYN) 500 MG tablet 30 tablet 0     Sig: TAKE ONE TABLET BY MOUTH TWICE A DAY WITH MEALS        Last Filled:  2/7/22    Patient Phone Number: [phone] (home)     Last appt: 4/26/2022   Next appt: Visit date not found    Last OARRS:   RX Monitoring 2/18/2020   Periodic Controlled Substance Monitoring Possible medication side effects, risk of tolerance/dependence & alternative treatments discussed. ;No signs of potential drug abuse or diversion identified. 70

## 2022-09-14 DIAGNOSIS — R59.0 AXILLARY LYMPHADENOPATHY: ICD-10-CM

## 2022-09-14 RX ORDER — NAPROXEN 500 MG/1
TABLET ORAL
Qty: 30 TABLET | Refills: 0 | Status: SHIPPED | OUTPATIENT
Start: 2022-09-14 | End: 2022-09-29

## 2022-09-14 NOTE — TELEPHONE ENCOUNTER
Medication:   Requested Prescriptions     Pending Prescriptions Disp Refills    naproxen (NAPROSYN) 500 MG tablet 30 tablet 0     Sig: TAKE ONE TABLET BY MOUTH TWICE A DAY WITH MEALS        Last Filled:  5/16/22 30 tablets     Patient Phone Number: [phone] (home)     Last appt: 4/26/2022   Next appt: Visit date not found    Last OARRS:   RX Monitoring 2/18/2020   Periodic Controlled Substance Monitoring Possible medication side effects, risk of tolerance/dependence & alternative treatments discussed. ;No signs of potential drug abuse or diversion identified.

## 2022-09-28 DIAGNOSIS — R59.0 AXILLARY LYMPHADENOPATHY: ICD-10-CM

## 2022-09-29 RX ORDER — NAPROXEN 500 MG/1
TABLET ORAL
Qty: 30 TABLET | Refills: 0 | Status: SHIPPED | OUTPATIENT
Start: 2022-09-29 | End: 2022-10-18

## 2022-10-17 DIAGNOSIS — R59.0 AXILLARY LYMPHADENOPATHY: ICD-10-CM

## 2022-10-17 NOTE — TELEPHONE ENCOUNTER
Medication:   Requested Prescriptions     Pending Prescriptions Disp Refills    naproxen (NAPROSYN) 500 MG tablet [Pharmacy Med Name: NAPROXEN 500 MG TABLET] 30 tablet 0     Sig: TAKE ONE TABLET BY MOUTH TWICE A DAY WITH MEALS        Last Filled:  9/29/22    Patient Phone Number: [phone] (home)     Last appt: 4/26/2022   Next appt: Visit date not found    Last OARRS:   RX Monitoring 2/18/2020   Periodic Controlled Substance Monitoring Possible medication side effects, risk of tolerance/dependence & alternative treatments discussed. ;No signs of potential drug abuse or diversion identified.

## 2022-10-18 RX ORDER — NAPROXEN 500 MG/1
TABLET ORAL
Qty: 30 TABLET | Refills: 0 | Status: SHIPPED | OUTPATIENT
Start: 2022-10-18

## 2022-12-08 ENCOUNTER — TELEPHONE (OUTPATIENT)
Dept: FAMILY MEDICINE CLINIC | Age: 37
End: 2022-12-08

## 2022-12-08 NOTE — TELEPHONE ENCOUNTER
On hold for 12+ minutes. We have not received a medical record request. Please have them fax if they call back in.

## 2022-12-08 NOTE — TELEPHONE ENCOUNTER
----- Message from Reid Edilia sent at 12/7/2022  5:05 PM EST -----  Subject: Message to Provider    QUESTIONS  Information for Provider? Margarita Templeton from medical record would like to check   the status of the records they requested. ---------------------------------------------------------------------------  --------------  Fausto Rojas INFO  [phone]; OK to leave message on voicemail  ---------------------------------------------------------------------------  --------------  SCRIPT ANSWERS  Relationship to Patient? Third Party  Third Party Type? Other  Other Third Party Type? Medical records  Representative Name?  Gray Venegas

## 2022-12-13 NOTE — TELEPHONE ENCOUNTER
Medication:   Requested Prescriptions     Pending Prescriptions Disp Refills    sertraline (ZOLOFT) 100 MG tablet [Pharmacy Med Name: SERTRALINE  MG TABLET] 45 tablet      Sig: TAKE 1 AND 1/2 TABLET BY MOUTH DAILY     Last Filled:  4/26/2022    Last appt: 4/26/2022   Next appt: Visit date not found    Last OARRS:   RX Monitoring 2/18/2020   Periodic Controlled Substance Monitoring Possible medication side effects, risk of tolerance/dependence & alternative treatments discussed. ;No signs of potential drug abuse or diversion identified.

## 2022-12-14 RX ORDER — SERTRALINE HYDROCHLORIDE 100 MG/1
TABLET, FILM COATED ORAL
Qty: 45 TABLET | Refills: 2 | Status: SHIPPED | OUTPATIENT
Start: 2022-12-14

## 2023-01-19 ENCOUNTER — TELEPHONE (OUTPATIENT)
Dept: FAMILY MEDICINE CLINIC | Age: 38
End: 2023-01-19

## 2023-01-19 DIAGNOSIS — F33.1 MODERATE EPISODE OF RECURRENT MAJOR DEPRESSIVE DISORDER (HCC): Primary | ICD-10-CM

## 2023-01-19 NOTE — TELEPHONE ENCOUNTER
Medication:   Requested Prescriptions     Pending Prescriptions Disp Refills    traZODone (DESYREL) 100 MG tablet [Pharmacy Med Name: traZODone 100 MG TABLET] 30 tablet      Sig: TAKE ONE TABLET BY MOUTH DAILY        Last Filled: 4/26/2022   Last appt: 4/26/2022   Next appt: NONE

## 2023-01-19 NOTE — TELEPHONE ENCOUNTER
Medication:   Requested Prescriptions     Pending Prescriptions Disp Refills    traZODone (DESYREL) 100 MG tablet 90 tablet 1     Sig: Take 1 tablet by mouth nightly        Last Filled:  4/26/22    Patient Phone Number: [phone] (home)     Last appt: 4/26/2022   Next appt: Visit date not found    Last OARRS:   RX Monitoring 2/18/2020   Periodic Controlled Substance Monitoring Possible medication side effects, risk of tolerance/dependence & alternative treatments discussed. ;No signs of potential drug abuse or diversion identified.

## 2023-01-20 RX ORDER — TRAZODONE HYDROCHLORIDE 100 MG/1
TABLET ORAL
Qty: 30 TABLET | Refills: 2 | Status: SHIPPED | OUTPATIENT
Start: 2023-01-20

## 2023-02-20 ENCOUNTER — OFFICE VISIT (OUTPATIENT)
Dept: FAMILY MEDICINE CLINIC | Age: 38
End: 2023-02-20
Payer: COMMERCIAL

## 2023-02-20 VITALS
SYSTOLIC BLOOD PRESSURE: 108 MMHG | HEART RATE: 84 BPM | OXYGEN SATURATION: 96 % | DIASTOLIC BLOOD PRESSURE: 68 MMHG | HEIGHT: 62 IN | WEIGHT: 136 LBS | BODY MASS INDEX: 25.03 KG/M2

## 2023-02-20 DIAGNOSIS — F51.01 PRIMARY INSOMNIA: ICD-10-CM

## 2023-02-20 DIAGNOSIS — F33.1 MODERATE EPISODE OF RECURRENT MAJOR DEPRESSIVE DISORDER (HCC): Primary | ICD-10-CM

## 2023-02-20 PROCEDURE — G8484 FLU IMMUNIZE NO ADMIN: HCPCS | Performed by: FAMILY MEDICINE

## 2023-02-20 PROCEDURE — G8420 CALC BMI NORM PARAMETERS: HCPCS | Performed by: FAMILY MEDICINE

## 2023-02-20 PROCEDURE — 4004F PT TOBACCO SCREEN RCVD TLK: CPT | Performed by: FAMILY MEDICINE

## 2023-02-20 PROCEDURE — 99214 OFFICE O/P EST MOD 30 MIN: CPT | Performed by: FAMILY MEDICINE

## 2023-02-20 PROCEDURE — G8427 DOCREV CUR MEDS BY ELIG CLIN: HCPCS | Performed by: FAMILY MEDICINE

## 2023-02-20 RX ORDER — DOXEPIN HYDROCHLORIDE 10 MG/1
10 CAPSULE ORAL NIGHTLY
Qty: 30 CAPSULE | Refills: 3 | Status: SHIPPED | OUTPATIENT
Start: 2023-02-20

## 2023-02-20 SDOH — ECONOMIC STABILITY: FOOD INSECURITY: WITHIN THE PAST 12 MONTHS, YOU WORRIED THAT YOUR FOOD WOULD RUN OUT BEFORE YOU GOT MONEY TO BUY MORE.: NEVER TRUE

## 2023-02-20 SDOH — ECONOMIC STABILITY: HOUSING INSECURITY
IN THE LAST 12 MONTHS, WAS THERE A TIME WHEN YOU DID NOT HAVE A STEADY PLACE TO SLEEP OR SLEPT IN A SHELTER (INCLUDING NOW)?: NO

## 2023-02-20 SDOH — ECONOMIC STABILITY: FOOD INSECURITY: WITHIN THE PAST 12 MONTHS, THE FOOD YOU BOUGHT JUST DIDN'T LAST AND YOU DIDN'T HAVE MONEY TO GET MORE.: NEVER TRUE

## 2023-02-20 SDOH — ECONOMIC STABILITY: INCOME INSECURITY: HOW HARD IS IT FOR YOU TO PAY FOR THE VERY BASICS LIKE FOOD, HOUSING, MEDICAL CARE, AND HEATING?: NOT HARD AT ALL

## 2023-02-20 ASSESSMENT — PATIENT HEALTH QUESTIONNAIRE - PHQ9
SUM OF ALL RESPONSES TO PHQ QUESTIONS 1-9: 6
7. TROUBLE CONCENTRATING ON THINGS, SUCH AS READING THE NEWSPAPER OR WATCHING TELEVISION: 1
4. FEELING TIRED OR HAVING LITTLE ENERGY: 2
9. THOUGHTS THAT YOU WOULD BE BETTER OFF DEAD, OR OF HURTING YOURSELF: 0
6. FEELING BAD ABOUT YOURSELF - OR THAT YOU ARE A FAILURE OR HAVE LET YOURSELF OR YOUR FAMILY DOWN: 0
8. MOVING OR SPEAKING SO SLOWLY THAT OTHER PEOPLE COULD HAVE NOTICED. OR THE OPPOSITE, BEING SO FIGETY OR RESTLESS THAT YOU HAVE BEEN MOVING AROUND A LOT MORE THAN USUAL: 0
5. POOR APPETITE OR OVEREATING: 0
SUM OF ALL RESPONSES TO PHQ QUESTIONS 1-9: 6
SUM OF ALL RESPONSES TO PHQ9 QUESTIONS 1 & 2: 1
2. FEELING DOWN, DEPRESSED OR HOPELESS: 0
1. LITTLE INTEREST OR PLEASURE IN DOING THINGS: 1
3. TROUBLE FALLING OR STAYING ASLEEP: 2
10. IF YOU CHECKED OFF ANY PROBLEMS, HOW DIFFICULT HAVE THESE PROBLEMS MADE IT FOR YOU TO DO YOUR WORK, TAKE CARE OF THINGS AT HOME, OR GET ALONG WITH OTHER PEOPLE: 1

## 2023-02-20 NOTE — PROGRESS NOTES
Priyanka Neumann (:  1985) is a 43 y.o. female,Established patient, here for evaluation of the following chief complaint(s):  Discuss Medications (Patient stopped Trazodone due to sleepiness/oversleeping, but still has difficulty staying asleep. )         ASSESSMENT/PLAN:  Priyanka was seen today for discuss medications. Diagnoses and all orders for this visit:    Moderate episode of recurrent major depressive disorder ([facility] [address]  Stable on zoloft  Primary insomnia  Change to doxepin since not well controlled   Medication side affects and adverse reactions reviewed. Other orders  -     doxepin (SINEQUAN) 10 MG capsule; Take 1 capsule by mouth nightly       No follow-ups on file. Subjective   SUBJECTIVE/OBJECTIVE:  HPI  Pt is a of 43 y.o. female comes in today with   Chief Complaint   Patient presents with    Discuss Medications     Patient stopped Trazodone due to sleepiness/oversleeping, but still has difficulty staying asleep. Stopped trazodone. Was too tired during the day. Mood okay but still irritable. More fatigue. Vitals:    23 1356   BP: 108/68   Site: Right Upper Arm   Position: Sitting   Cuff Size: Small Adult   Pulse: 84   SpO2: 96%   Weight: 136 lb (61.7 kg)   Height: 5' 2\" (1.575 m)        Past Medical History:Reviewed  Medications:Reviewed. No Known Allergies   Social hx:Reviewed. Social History     Tobacco Use   Smoking Status Every Day    Packs/day: 0.35    Years: 20.00    Pack years: 7.00    Types: Cigarettes    Start date: 2000   Smokeless Tobacco Never        Review of Systems       Objective   Physical Exam         An electronic signature was used to authenticate this note.     --Karina Rowley MD

## 2023-03-08 RX ORDER — TRAZODONE HYDROCHLORIDE 100 MG/1
100 TABLET ORAL NIGHTLY
Qty: 90 TABLET | Refills: 1 | Status: CANCELLED | OUTPATIENT
Start: 2023-03-08

## 2023-03-20 RX ORDER — SERTRALINE HYDROCHLORIDE 100 MG/1
TABLET, FILM COATED ORAL
Qty: 45 TABLET | Refills: 2 | Status: SHIPPED | OUTPATIENT
Start: 2023-03-20

## 2023-03-20 NOTE — TELEPHONE ENCOUNTER
Medication:   Requested Prescriptions     Pending Prescriptions Disp Refills    sertraline (ZOLOFT) 100 MG tablet [Pharmacy Med Name: SERTRALINE  MG TABLET] 45 tablet 2     Sig: TAKE 1 AND 1/2 TABLET BY MOUTH DAILY        Last Filled: 12/14/2022   Last appt: 2/20/2023   Next appt: none

## 2023-06-26 RX ORDER — DOXEPIN HYDROCHLORIDE 10 MG/1
CAPSULE ORAL
Qty: 30 CAPSULE | Refills: 3 | Status: SHIPPED | OUTPATIENT
Start: 2023-06-26

## 2023-06-26 RX ORDER — SERTRALINE HYDROCHLORIDE 100 MG/1
TABLET, FILM COATED ORAL
Qty: 45 TABLET | Refills: 2 | Status: SHIPPED | OUTPATIENT
Start: 2023-06-26

## 2023-07-07 ENCOUNTER — OFFICE VISIT (OUTPATIENT)
Dept: FAMILY MEDICINE CLINIC | Age: 38
End: 2023-07-07
Payer: COMMERCIAL

## 2023-07-07 VITALS
HEIGHT: 62 IN | DIASTOLIC BLOOD PRESSURE: 80 MMHG | WEIGHT: 143.8 LBS | BODY MASS INDEX: 26.46 KG/M2 | HEART RATE: 82 BPM | OXYGEN SATURATION: 95 % | SYSTOLIC BLOOD PRESSURE: 120 MMHG

## 2023-07-07 DIAGNOSIS — F33.1 MODERATE EPISODE OF RECURRENT MAJOR DEPRESSIVE DISORDER (HCC): ICD-10-CM

## 2023-07-07 DIAGNOSIS — F41.9 ANXIETY: Primary | ICD-10-CM

## 2023-07-07 PROCEDURE — G8427 DOCREV CUR MEDS BY ELIG CLIN: HCPCS | Performed by: FAMILY MEDICINE

## 2023-07-07 PROCEDURE — 99213 OFFICE O/P EST LOW 20 MIN: CPT | Performed by: FAMILY MEDICINE

## 2023-07-07 PROCEDURE — G8419 CALC BMI OUT NRM PARAM NOF/U: HCPCS | Performed by: FAMILY MEDICINE

## 2023-07-07 PROCEDURE — 4004F PT TOBACCO SCREEN RCVD TLK: CPT | Performed by: FAMILY MEDICINE

## 2023-07-07 RX ORDER — ESCITALOPRAM OXALATE 20 MG/1
20 TABLET ORAL DAILY
Qty: 30 TABLET | Refills: 3 | Status: SHIPPED | OUTPATIENT
Start: 2023-07-07

## 2023-07-07 RX ORDER — NAPROXEN 500 MG/1
500 TABLET ORAL PRN
COMMUNITY

## 2023-07-07 NOTE — PROGRESS NOTES
Priyanka Reyna (:  1985) is a 43 y.o. female,Established patient, here for evaluation of the following chief complaint(s):  Medication Check         ASSESSMENT/PLAN:  Priyanka was seen today for medication check. Diagnoses and all orders for this visit:    Anxiety    Moderate episode of recurrent major depressive disorder ([address])    Other orders  -     escitalopram (LEXAPRO) 20 MG tablet; Take 1 tablet by mouth daily    Not well controlled  Switch zoloft to lexpro     No follow-ups on file. Subjective   SUBJECTIVE/OBJECTIVE:  HPI  Pt is a of 43 y.o. female comes in today with   Chief Complaint   Patient presents with    Medication Check     More stress at work  Over the past month has been worse. Not handling stress as well. Jittery andd stressed all the time. Went up 50 mg on the zoloft for the past 2 weeks and not helping. Some depressed mood. More irritable. Can't stay positive    Vitals:    23 1525   BP: 120/80   Site: Left Upper Arm   Position: Sitting   Cuff Size: Medium Adult   Pulse: 82   SpO2: 95%   Weight: 143 lb 12.8 oz (65.2 kg)   Height: 5' 2\" (1.575 m)       Past Medical History:Reviewed  Medications:Reviewed. No Known Allergies   Social hx:Reviewed. Social History     Tobacco Use   Smoking Status Every Day    Packs/day: 0.50    Years: 20.00    Pack years: 10.00    Types: Cigarettes    Start date: 2000   Smokeless Tobacco Never        Review of Systems       Objective   Physical Exam         An electronic signature was used to authenticate this note.     --Smith Chapa MD

## 2023-09-05 RX ORDER — DOXEPIN HYDROCHLORIDE 10 MG/1
10 CAPSULE ORAL NIGHTLY
Qty: 30 CAPSULE | Refills: 3 | Status: SHIPPED | OUTPATIENT
Start: 2023-09-05

## 2023-09-05 NOTE — TELEPHONE ENCOUNTER
Medication:   Requested Prescriptions     Pending Prescriptions Disp Refills    doxepin (SINEQUAN) 10 MG capsule 30 capsule 3     Sig: Take 1 capsule by mouth nightly        Last Filled:  6/26/23    Patient Phone Number: [phone] (home)     Last appt: 7/7/2023   Next appt: Visit date not found    Last OARRS:   RX Monitoring 2/18/2020   Periodic Controlled Substance Monitoring Possible medication side effects, risk of tolerance/dependence & alternative treatments discussed. ;No signs of potential drug abuse or diversion identified.

## 2024-05-08 ENCOUNTER — OFFICE VISIT (OUTPATIENT)
Dept: FAMILY MEDICINE CLINIC | Age: 39
End: 2024-05-08
Payer: COMMERCIAL

## 2024-05-08 VITALS
SYSTOLIC BLOOD PRESSURE: 102 MMHG | HEART RATE: 83 BPM | OXYGEN SATURATION: 98 % | HEIGHT: 62 IN | WEIGHT: 142 LBS | BODY MASS INDEX: 26.13 KG/M2 | DIASTOLIC BLOOD PRESSURE: 74 MMHG

## 2024-05-08 DIAGNOSIS — M62.838 MUSCLE SPASM: ICD-10-CM

## 2024-05-08 DIAGNOSIS — S23.9XXA THORACIC BACK SPRAIN, INITIAL ENCOUNTER: Primary | ICD-10-CM

## 2024-05-08 DIAGNOSIS — M54.50 ACUTE LEFT-SIDED LOW BACK PAIN WITHOUT SCIATICA: ICD-10-CM

## 2024-05-08 LAB
BILIRUBIN, POC: NORMAL
BLOOD URINE, POC: NORMAL
CLARITY, POC: CLEAR
COLOR, POC: YELLOW
GLUCOSE URINE, POC: NORMAL
KETONES, POC: NORMAL
LEUKOCYTE EST, POC: NORMAL
NITRITE, POC: NORMAL
PH, POC: 7
PROTEIN, POC: NORMAL
SPECIFIC GRAVITY, POC: 1.02
UROBILINOGEN, POC: 0.2

## 2024-05-08 PROCEDURE — 81003 URINALYSIS AUTO W/O SCOPE: CPT | Performed by: NURSE PRACTITIONER

## 2024-05-08 PROCEDURE — 4004F PT TOBACCO SCREEN RCVD TLK: CPT | Performed by: NURSE PRACTITIONER

## 2024-05-08 PROCEDURE — G8419 CALC BMI OUT NRM PARAM NOF/U: HCPCS | Performed by: NURSE PRACTITIONER

## 2024-05-08 PROCEDURE — G8427 DOCREV CUR MEDS BY ELIG CLIN: HCPCS | Performed by: NURSE PRACTITIONER

## 2024-05-08 PROCEDURE — 99214 OFFICE O/P EST MOD 30 MIN: CPT | Performed by: NURSE PRACTITIONER

## 2024-05-08 RX ORDER — BUSPIRONE HYDROCHLORIDE 30 MG/1
30 TABLET ORAL DAILY
COMMUNITY
Start: 2024-03-15

## 2024-05-08 RX ORDER — BACLOFEN 10 MG/1
10 TABLET ORAL NIGHTLY PRN
Qty: 3 TABLET | Refills: 0 | Status: SHIPPED | OUTPATIENT
Start: 2024-05-08 | End: 2024-05-11

## 2024-05-08 SDOH — ECONOMIC STABILITY: INCOME INSECURITY: HOW HARD IS IT FOR YOU TO PAY FOR THE VERY BASICS LIKE FOOD, HOUSING, MEDICAL CARE, AND HEATING?: NOT HARD AT ALL

## 2024-05-08 SDOH — ECONOMIC STABILITY: FOOD INSECURITY: WITHIN THE PAST 12 MONTHS, YOU WORRIED THAT YOUR FOOD WOULD RUN OUT BEFORE YOU GOT MONEY TO BUY MORE.: NEVER TRUE

## 2024-05-08 SDOH — ECONOMIC STABILITY: FOOD INSECURITY: WITHIN THE PAST 12 MONTHS, THE FOOD YOU BOUGHT JUST DIDN'T LAST AND YOU DIDN'T HAVE MONEY TO GET MORE.: NEVER TRUE

## 2024-05-08 SDOH — ECONOMIC STABILITY: TRANSPORTATION INSECURITY
IN THE PAST 12 MONTHS, HAS LACK OF TRANSPORTATION KEPT YOU FROM MEETINGS, WORK, OR FROM GETTING THINGS NEEDED FOR DAILY LIVING?: NO

## 2024-05-08 ASSESSMENT — ENCOUNTER SYMPTOMS
RHINORRHEA: 0
COUGH: 0
DIARRHEA: 0
CONSTIPATION: 0
EYE REDNESS: 0
EYE DISCHARGE: 0
SHORTNESS OF BREATH: 0
NAUSEA: 0
SINUS PAIN: 0
SORE THROAT: 0
EYE ITCHING: 0
EYE PAIN: 0
VOICE CHANGE: 0
CHOKING: 0
BLOOD IN STOOL: 0
PHOTOPHOBIA: 0
CHEST TIGHTNESS: 0
BACK PAIN: 1
STRIDOR: 0
TROUBLE SWALLOWING: 0
ABDOMINAL PAIN: 0
VOMITING: 0
SINUS PRESSURE: 0
WHEEZING: 0
COLOR CHANGE: 0

## 2024-05-08 ASSESSMENT — PATIENT HEALTH QUESTIONNAIRE - PHQ9
10. IF YOU CHECKED OFF ANY PROBLEMS, HOW DIFFICULT HAVE THESE PROBLEMS MADE IT FOR YOU TO DO YOUR WORK, TAKE CARE OF THINGS AT HOME, OR GET ALONG WITH OTHER PEOPLE: NOT DIFFICULT AT ALL
6. FEELING BAD ABOUT YOURSELF - OR THAT YOU ARE A FAILURE OR HAVE LET YOURSELF OR YOUR FAMILY DOWN: NOT AT ALL
6. FEELING BAD ABOUT YOURSELF - OR THAT YOU ARE A FAILURE OR HAVE LET YOURSELF OR YOUR FAMILY DOWN: NOT AT ALL
9. THOUGHTS THAT YOU WOULD BE BETTER OFF DEAD, OR OF HURTING YOURSELF: NOT AT ALL
2. FEELING DOWN, DEPRESSED OR HOPELESS: NOT AT ALL
3. TROUBLE FALLING OR STAYING ASLEEP: NOT AT ALL
1. LITTLE INTEREST OR PLEASURE IN DOING THINGS: NOT AT ALL
5. POOR APPETITE OR OVEREATING: NOT AT ALL
8. MOVING OR SPEAKING SO SLOWLY THAT OTHER PEOPLE COULD HAVE NOTICED. OR THE OPPOSITE - BEING SO FIDGETY OR RESTLESS THAT YOU HAVE BEEN MOVING AROUND A LOT MORE THAN USUAL: NOT AT ALL
3. TROUBLE FALLING OR STAYING ASLEEP: NOT AT ALL
4. FEELING TIRED OR HAVING LITTLE ENERGY: NOT AT ALL
SUM OF ALL RESPONSES TO PHQ QUESTIONS 1-9: 0
SUM OF ALL RESPONSES TO PHQ QUESTIONS 1-9: 0
9. THOUGHTS THAT YOU WOULD BE BETTER OFF DEAD, OR OF HURTING YOURSELF: NOT AT ALL
SUM OF ALL RESPONSES TO PHQ QUESTIONS 1-9: 0
7. TROUBLE CONCENTRATING ON THINGS, SUCH AS READING THE NEWSPAPER OR WATCHING TELEVISION: NOT AT ALL
1. LITTLE INTEREST OR PLEASURE IN DOING THINGS: NOT AT ALL
SUM OF ALL RESPONSES TO PHQ QUESTIONS 1-9: 0
2. FEELING DOWN, DEPRESSED OR HOPELESS: NOT AT ALL
5. POOR APPETITE OR OVEREATING: NOT AT ALL
SUM OF ALL RESPONSES TO PHQ QUESTIONS 1-9: 0
10. IF YOU CHECKED OFF ANY PROBLEMS, HOW DIFFICULT HAVE THESE PROBLEMS MADE IT FOR YOU TO DO YOUR WORK, TAKE CARE OF THINGS AT HOME, OR GET ALONG WITH OTHER PEOPLE: NOT DIFFICULT AT ALL
SUM OF ALL RESPONSES TO PHQ9 QUESTIONS 1 & 2: 0
4. FEELING TIRED OR HAVING LITTLE ENERGY: NOT AT ALL
8. MOVING OR SPEAKING SO SLOWLY THAT OTHER PEOPLE COULD HAVE NOTICED. OR THE OPPOSITE, BEING SO FIGETY OR RESTLESS THAT YOU HAVE BEEN MOVING AROUND A LOT MORE THAN USUAL: NOT AT ALL
7. TROUBLE CONCENTRATING ON THINGS, SUCH AS READING THE NEWSPAPER OR WATCHING TELEVISION: NOT AT ALL

## 2024-05-08 NOTE — PROGRESS NOTES
Constitutional:       General: She is not in acute distress.     Appearance: Normal appearance. She is well-developed.   HENT:      Head: Normocephalic and atraumatic.      Right Ear: Hearing and external ear normal.      Left Ear: Hearing and external ear normal.      Nose: Nose normal.      Right Sinus: No maxillary sinus tenderness or frontal sinus tenderness.      Left Sinus: No maxillary sinus tenderness or frontal sinus tenderness.   Eyes:      General:         Right eye: No discharge.         Left eye: No discharge.      Conjunctiva/sclera: Conjunctivae normal.   Neck:      Thyroid: No thyromegaly.      Vascular: No JVD.      Trachea: No tracheal deviation.   Cardiovascular:      Rate and Rhythm: Normal rate and regular rhythm.      Heart sounds: Normal heart sounds. No murmur heard.     No friction rub.   Pulmonary:      Effort: Pulmonary effort is normal. No respiratory distress.      Breath sounds: Normal breath sounds. No stridor. No decreased breath sounds, wheezing, rhonchi or rales.   Musculoskeletal:         General: Tenderness present. Normal range of motion.        Arms:       Cervical back: Normal range of motion.   Lymphadenopathy:      Cervical: No cervical adenopathy.   Skin:     General: Skin is warm and dry.      Capillary Refill: Capillary refill takes less than 2 seconds.      Findings: No rash.   Neurological:      Mental Status: She is alert and oriented to person, place, and time.      Sensory: Sensation is intact.      Motor: Motor function is intact.      Coordination: Coordination normal.   Psychiatric:         Attention and Perception: Attention and perception normal.         Mood and Affect: Mood normal.         Speech: Speech normal.         Behavior: Behavior normal. Behavior is cooperative.         Thought Content: Thought content normal.         Cognition and Memory: Cognition normal.         Judgment: Judgment normal.       ASSESSMENT/PLAN:    1. Thoracic back sprain, initial

## 2024-06-17 DIAGNOSIS — A60.00 GENITAL HERPES SIMPLEX, UNSPECIFIED SITE: ICD-10-CM

## 2024-06-17 RX ORDER — VALACYCLOVIR HYDROCHLORIDE 1 G/1
TABLET, FILM COATED ORAL
Qty: 10 TABLET | Refills: 1 | OUTPATIENT
Start: 2024-06-17

## 2024-06-17 NOTE — TELEPHONE ENCOUNTER
Medication:   Requested Prescriptions     Pending Prescriptions Disp Refills    valACYclovir (VALTREX) 1 g tablet 10 tablet 1     Sig: Take one pill po bid x 5 days       Last Filled:  4/14/21    Patient Phone Number: [phone] (home)     Last appt: 5/8/2024   Next appt: Visit date not found    Last Labs DM: No results found for: \"LABA1C\"  Last Lipid:   Lab Results   Component Value Date/Time    CHOL 197 04/26/2022 11:57 AM    TRIG 64 04/26/2022 11:57 AM     04/26/2022 11:57 AM     Last PSA: No results found for: \"PSA\"  Last Thyroid:   Lab Results   Component Value Date/Time    TSH 2.21 04/26/2022 11:57 AM

## 2024-08-15 ENCOUNTER — OFFICE VISIT (OUTPATIENT)
Dept: FAMILY MEDICINE CLINIC | Age: 39
End: 2024-08-15
Payer: COMMERCIAL

## 2024-08-15 VITALS
DIASTOLIC BLOOD PRESSURE: 88 MMHG | SYSTOLIC BLOOD PRESSURE: 126 MMHG | OXYGEN SATURATION: 96 % | BODY MASS INDEX: 25.21 KG/M2 | HEART RATE: 108 BPM | HEIGHT: 62 IN | WEIGHT: 137 LBS

## 2024-08-15 DIAGNOSIS — F33.1 MODERATE EPISODE OF RECURRENT MAJOR DEPRESSIVE DISORDER (HCC): Primary | ICD-10-CM

## 2024-08-15 DIAGNOSIS — A60.00 GENITAL HERPES SIMPLEX, UNSPECIFIED SITE: ICD-10-CM

## 2024-08-15 DIAGNOSIS — F41.9 ANXIETY: ICD-10-CM

## 2024-08-15 PROCEDURE — G8428 CUR MEDS NOT DOCUMENT: HCPCS | Performed by: FAMILY MEDICINE

## 2024-08-15 PROCEDURE — G8419 CALC BMI OUT NRM PARAM NOF/U: HCPCS | Performed by: FAMILY MEDICINE

## 2024-08-15 PROCEDURE — 99214 OFFICE O/P EST MOD 30 MIN: CPT | Performed by: FAMILY MEDICINE

## 2024-08-15 PROCEDURE — 4004F PT TOBACCO SCREEN RCVD TLK: CPT | Performed by: FAMILY MEDICINE

## 2024-08-15 RX ORDER — VALACYCLOVIR HYDROCHLORIDE 1 G/1
1000 TABLET, FILM COATED ORAL DAILY
Qty: 30 TABLET | Refills: 1 | Status: SHIPPED | OUTPATIENT
Start: 2024-08-15

## 2024-08-15 RX ORDER — BUSPIRONE HYDROCHLORIDE 30 MG/1
30 TABLET ORAL 2 TIMES DAILY
Qty: 60 TABLET | Refills: 5 | Status: SHIPPED | OUTPATIENT
Start: 2024-08-15

## 2024-08-15 NOTE — PROGRESS NOTES
danny Prince (:  1985) is a 39 y.o. female,Established patient, here for evaluation of the following chief complaint(s):  3 Month Follow-Up      Assessment & Plan   ASSESSMENT/PLAN:  Priyanka was seen today for 3 month follow-up.    Diagnoses and all orders for this visit:    Moderate episode of recurrent major depressive disorder (HCC)  Stable on lexapro  Anxiety  Not well controlled. Can increase buspar to bid  Genital herpes simplex, unspecified site  More recurrences. Can try valtrex  Other orders  -     valACYclovir (VALTREX) 1 g tablet; Take 1 tablet by mouth daily  -     busPIRone (BUSPAR) 30 MG tablet; Take 30 mg by mouth in the morning and at bedtime           No follow-ups on file.         Subjective   SUBJECTIVE/OBJECTIVE:  HPI  Pt is a of 39 y.o. female comes in today with   Chief Complaint   Patient presents with    3 Month Follow-Up     Currently on lexapro and buspar. Increased to 30 mg in November.    Vitals:    08/15/24 0842   BP: 126/88   Pulse: (!) 108   SpO2: 96%   Weight: 62.1 kg (137 lb)   Height: 1.575 m (5' 2\")       Past Medical History:Reviewed  Medications:Reviewed.  No Known Allergies   Social hx:Reviewed.  Social History     Tobacco Use   Smoking Status Every Day    Current packs/day: 0.50    Average packs/day: 0.5 packs/day for 24.6 years (12.3 ttl pk-yrs)    Types: Cigarettes    Start date: 2000   Smokeless Tobacco Never   Tobacco Comments    Requesting help to stop         Review of Systems       Objective   Physical Exam         An electronic signature was used to authenticate this note.    --Main Herrera MD

## 2024-09-13 RX ORDER — ESCITALOPRAM OXALATE 20 MG/1
20 TABLET ORAL DAILY
Qty: 30 TABLET | Refills: 3 | Status: SHIPPED | OUTPATIENT
Start: 2024-09-13

## 2024-09-18 ENCOUNTER — OFFICE VISIT (OUTPATIENT)
Dept: FAMILY MEDICINE CLINIC | Age: 39
End: 2024-09-18
Payer: COMMERCIAL

## 2024-09-18 VITALS
HEIGHT: 62 IN | WEIGHT: 139 LBS | HEART RATE: 97 BPM | DIASTOLIC BLOOD PRESSURE: 72 MMHG | OXYGEN SATURATION: 97 % | SYSTOLIC BLOOD PRESSURE: 138 MMHG | BODY MASS INDEX: 25.58 KG/M2

## 2024-09-18 DIAGNOSIS — M25.512 CHRONIC LEFT SHOULDER PAIN: Primary | ICD-10-CM

## 2024-09-18 DIAGNOSIS — G89.29 CHRONIC LEFT SHOULDER PAIN: Primary | ICD-10-CM

## 2024-09-18 DIAGNOSIS — F33.1 MODERATE EPISODE OF RECURRENT MAJOR DEPRESSIVE DISORDER (HCC): ICD-10-CM

## 2024-09-18 PROCEDURE — G8427 DOCREV CUR MEDS BY ELIG CLIN: HCPCS | Performed by: FAMILY MEDICINE

## 2024-09-18 PROCEDURE — 99214 OFFICE O/P EST MOD 30 MIN: CPT | Performed by: FAMILY MEDICINE

## 2024-09-18 PROCEDURE — G8419 CALC BMI OUT NRM PARAM NOF/U: HCPCS | Performed by: FAMILY MEDICINE

## 2024-09-18 PROCEDURE — 4004F PT TOBACCO SCREEN RCVD TLK: CPT | Performed by: FAMILY MEDICINE

## 2024-09-18 RX ORDER — BUPROPION HYDROCHLORIDE 150 MG/1
150 TABLET ORAL EVERY MORNING
Qty: 30 TABLET | Refills: 3 | Status: SHIPPED | OUTPATIENT
Start: 2024-09-18

## 2024-09-18 RX ORDER — NAPROXEN 500 MG/1
500 TABLET ORAL PRN
Qty: 60 TABLET | Refills: 2 | Status: SHIPPED | OUTPATIENT
Start: 2024-09-18

## 2024-09-19 ENCOUNTER — TELEPHONE (OUTPATIENT)
Dept: FAMILY MEDICINE CLINIC | Age: 39
End: 2024-09-19

## 2024-09-20 RX ORDER — NAPROXEN 500 MG/1
500 TABLET ORAL PRN
Qty: 60 TABLET | Refills: 2 | OUTPATIENT
Start: 2024-09-20

## 2024-09-20 NOTE — TELEPHONE ENCOUNTER
Medication:   Requested Prescriptions     Pending Prescriptions Disp Refills    naproxen (NAPROSYN) 500 MG tablet 60 tablet 2     Sig: Take 1 tablet by mouth as needed for Pain As needed last dose two days ago       Last Filled:  9/18/24Duplicate request.    Patient Phone Number: [phone] (home)     Last appt: 9/18/2024   Next appt: Visit date not found    Last Labs DM: No results found for: \"LABA1C\"  Last Lipid:   Lab Results   Component Value Date/Time    CHOL 197 04/26/2022 11:57 AM    TRIG 64 04/26/2022 11:57 AM     04/26/2022 11:57 AM     Last PSA: No results found for: \"PSA\"  Last Thyroid:   Lab Results   Component Value Date/Time    TSH 2.21 04/26/2022 11:57 AM

## 2024-09-25 RX ORDER — NAPROXEN 500 MG/1
500 TABLET ORAL 2 TIMES DAILY PRN
Qty: 60 TABLET | Refills: 2 | Status: SHIPPED | OUTPATIENT
Start: 2024-09-25

## 2024-10-02 ENCOUNTER — OFFICE VISIT (OUTPATIENT)
Dept: ORTHOPEDIC SURGERY | Age: 39
End: 2024-10-02
Payer: COMMERCIAL

## 2024-10-02 VITALS — WEIGHT: 138 LBS | BODY MASS INDEX: 25.4 KG/M2 | HEIGHT: 62 IN

## 2024-10-02 DIAGNOSIS — M25.512 LEFT SHOULDER PAIN, UNSPECIFIED CHRONICITY: Primary | ICD-10-CM

## 2024-10-02 DIAGNOSIS — S43.432A TEAR OF LEFT GLENOID LABRUM, INITIAL ENCOUNTER: ICD-10-CM

## 2024-10-02 PROCEDURE — G8484 FLU IMMUNIZE NO ADMIN: HCPCS | Performed by: ORTHOPAEDIC SURGERY

## 2024-10-02 PROCEDURE — 4004F PT TOBACCO SCREEN RCVD TLK: CPT | Performed by: ORTHOPAEDIC SURGERY

## 2024-10-02 PROCEDURE — G8427 DOCREV CUR MEDS BY ELIG CLIN: HCPCS | Performed by: ORTHOPAEDIC SURGERY

## 2024-10-02 PROCEDURE — 99204 OFFICE O/P NEW MOD 45 MIN: CPT | Performed by: ORTHOPAEDIC SURGERY

## 2024-10-02 PROCEDURE — G8419 CALC BMI OUT NRM PARAM NOF/U: HCPCS | Performed by: ORTHOPAEDIC SURGERY

## 2024-10-02 SDOH — HEALTH STABILITY: PHYSICAL HEALTH: ON AVERAGE, HOW MANY DAYS PER WEEK DO YOU ENGAGE IN MODERATE TO STRENUOUS EXERCISE (LIKE A BRISK WALK)?: 0 DAYS

## 2024-10-02 SDOH — HEALTH STABILITY: PHYSICAL HEALTH: ON AVERAGE, HOW MANY MINUTES DO YOU ENGAGE IN EXERCISE AT THIS LEVEL?: 0 MIN

## 2024-10-02 NOTE — PROGRESS NOTES
Date:  10/2/2024    Name:  Priyanka Prince  Address:  [address] [place] [address]   [place] [address]    :  1985      Age:   39 y.o.    SSN:  [id number]      Medical Record Number:  [id number]    Reason for Visit:    Chief Complaint    Shoulder Pain (Np, Lt shoulder)      DOS:10/2/2024     HPI: Priyanka Prince is a 39 y.o. female here today for elevation of left shoulder.  The pain started around 1 month ago.  She does not recall any trauma or fall.  Her job requires her to lift multiple heavy objects.  However she does not recall a particular event that caused her pain.  She believes the pain has been the same symptoms started 1 month ago.  This may localized to the anterior and posterior aspect of her shoulder.  This made her very difficult for her to move her shoulder.  Motions across her shoulder and behind her back at the worst.  She was prescribed naproxen by her PCP which has not to really help with her symptoms.  She is unable to sleep on her left side.  She does report that the pain sometimes wakes her up at night.      Pain Assessment  Location of Pain: Shoulder  Location Modifiers: Left  Severity of Pain: 8  Quality of Pain: Sharp, Aching  Frequency of Pain: Intermittent  Aggravating Factors:  (CERTAIN MOVEMENTS)  Limiting Behavior: Yes  Relieving Factors: Rest  Result of Injury: No  Work-Related Injury: No  Are there other pain locations you wish to document?: No  ROS: Review of systems reviewed from Patient History Form completed today and available in the patient's chart under the Media tab.       Past Medical History:   Diagnosis Date    Anxiety     Chronic low back pain     Depression     Hemorrhoids     Herpes simplex virus (HSV) infection     Insomnia     Mood swing         Past Surgical History:   Procedure Laterality Date     SECTION      EYE SURGERY      EYE SURGERY         Family History   Problem Relation Age of Onset    Cancer Maternal Grandmother         lymphoma    Diabetes Maternal

## 2024-10-13 DIAGNOSIS — M62.838 MUSCLE SPASM: ICD-10-CM

## 2024-10-13 DIAGNOSIS — M54.50 ACUTE LEFT-SIDED LOW BACK PAIN WITHOUT SCIATICA: ICD-10-CM

## 2024-10-13 DIAGNOSIS — S23.9XXA THORACIC BACK SPRAIN, INITIAL ENCOUNTER: ICD-10-CM

## 2024-10-14 ENCOUNTER — PATIENT MESSAGE (OUTPATIENT)
Dept: FAMILY MEDICINE CLINIC | Age: 39
End: 2024-10-14

## 2024-10-14 RX ORDER — ESCITALOPRAM OXALATE 20 MG/1
20 TABLET ORAL DAILY
Qty: 30 TABLET | Refills: 3 | Status: SHIPPED | OUTPATIENT
Start: 2024-10-14

## 2024-10-14 RX ORDER — BUPROPION HYDROCHLORIDE 150 MG/1
150 TABLET ORAL EVERY MORNING
Qty: 30 TABLET | Refills: 3 | Status: SHIPPED | OUTPATIENT
Start: 2024-10-14

## 2024-10-14 NOTE — TELEPHONE ENCOUNTER
Medication:   Requested Prescriptions     Pending Prescriptions Disp Refills    baclofen (LIORESAL) 10 MG tablet [Pharmacy Med Name: BACLOFEN 10 MG TABLET] 3 tablet 0     Sig: TAKE 1 TABLET BY MOUTH NIGHTLY AS NEEDED (FOR LEFT BACK SPRAIN)        Last Filled:  5/8/24    Patient Phone Number: [phone] (home)     Last appt: 9/18/2024   Next appt: Visit date not found    Last OARRS:       2/18/2020    10:41 AM   RX Monitoring   Periodic Controlled Substance Monitoring Possible medication side effects, risk of tolerance/dependence & alternative treatments discussed.;No signs of potential drug abuse or diversion identified.

## 2024-10-15 RX ORDER — BACLOFEN 10 MG/1
TABLET ORAL
Qty: 3 TABLET | Refills: 0 | OUTPATIENT
Start: 2024-10-15

## 2024-10-21 ENCOUNTER — OFFICE VISIT (OUTPATIENT)
Dept: FAMILY MEDICINE CLINIC | Age: 39
End: 2024-10-21
Payer: COMMERCIAL

## 2024-10-21 VITALS
HEIGHT: 62 IN | BODY MASS INDEX: 26.68 KG/M2 | WEIGHT: 145 LBS | OXYGEN SATURATION: 97 % | SYSTOLIC BLOOD PRESSURE: 126 MMHG | DIASTOLIC BLOOD PRESSURE: 74 MMHG | HEART RATE: 77 BPM

## 2024-10-21 DIAGNOSIS — R30.9 PAIN WITH URINATION: ICD-10-CM

## 2024-10-21 DIAGNOSIS — A60.00 GENITAL HERPES SIMPLEX, UNSPECIFIED SITE: ICD-10-CM

## 2024-10-21 DIAGNOSIS — R35.0 URINE FREQUENCY: ICD-10-CM

## 2024-10-21 DIAGNOSIS — R35.0 URINE FREQUENCY: Primary | ICD-10-CM

## 2024-10-21 LAB
BILIRUBIN, POC: NORMAL
BLOOD URINE, POC: NORMAL
CLARITY, POC: NORMAL
COLOR, POC: NORMAL
GLUCOSE URINE, POC: NORMAL MG/DL
HERPES SIMPLEX VIRUS 1 IGG: POSITIVE
HERPES SIMPLEX VIRUS 2 IGG: POSITIVE
KETONES, POC: NORMAL MG/DL
LEUKOCYTE EST, POC: NORMAL
NITRITE, POC: NORMAL
PH, POC: 7
PROTEIN, POC: NORMAL MG/DL
SPECIFIC GRAVITY, POC: 1.01
UROBILINOGEN, POC: 0.2 MG/DL

## 2024-10-21 PROCEDURE — 99214 OFFICE O/P EST MOD 30 MIN: CPT | Performed by: NURSE PRACTITIONER

## 2024-10-21 PROCEDURE — G8419 CALC BMI OUT NRM PARAM NOF/U: HCPCS | Performed by: NURSE PRACTITIONER

## 2024-10-21 PROCEDURE — 81003 URINALYSIS AUTO W/O SCOPE: CPT | Performed by: NURSE PRACTITIONER

## 2024-10-21 PROCEDURE — G8484 FLU IMMUNIZE NO ADMIN: HCPCS | Performed by: NURSE PRACTITIONER

## 2024-10-21 PROCEDURE — 4004F PT TOBACCO SCREEN RCVD TLK: CPT | Performed by: NURSE PRACTITIONER

## 2024-10-21 PROCEDURE — G8427 DOCREV CUR MEDS BY ELIG CLIN: HCPCS | Performed by: NURSE PRACTITIONER

## 2024-10-21 RX ORDER — CEPHALEXIN 500 MG/1
500 CAPSULE ORAL 2 TIMES DAILY
Qty: 6 CAPSULE | Refills: 0 | Status: SHIPPED | OUTPATIENT
Start: 2024-10-21 | End: 2024-10-24

## 2024-10-21 ASSESSMENT — ENCOUNTER SYMPTOMS
WHEEZING: 0
EYE DISCHARGE: 0
SINUS PAIN: 0
DIARRHEA: 0
EYE PAIN: 0
SINUS PRESSURE: 0
COUGH: 0
COLOR CHANGE: 0
SORE THROAT: 0
PHOTOPHOBIA: 0
ABDOMINAL PAIN: 0
CHOKING: 0
SHORTNESS OF BREATH: 0
RHINORRHEA: 0
BACK PAIN: 0
EYE REDNESS: 0
VOICE CHANGE: 0
BLOOD IN STOOL: 0
NAUSEA: 0
CHEST TIGHTNESS: 0
EYE ITCHING: 0
VOMITING: 0
STRIDOR: 0
CONSTIPATION: 0
TROUBLE SWALLOWING: 0

## 2024-10-21 NOTE — PROGRESS NOTES
Chief Complaint   Patient presents with    Urinary Frequency       /74   Pulse 77   Ht 1.575 m (5' 2\")   Wt 65.8 kg (145 lb)   LMP 2024   SpO2 97%   BMI 26.52 kg/m²     HPI:  Priyanka Prince is a 39 y.o. (: 1985) here today   for   HPI    Patient's medications, allergies, past medical, surgical, social and family histories were reviewed and updated as appropriate.    UTI: urine frequency, more in the morning, started feeling it 4 days ago, took azo three times, this morning felt urgency, toward the end felt a burn, pressure. When whipped blood. Little red clot in toilet. No low back pain, no hx kidney stone.     Results for orders placed or performed in visit on 10/21/24   POCT Urinalysis No Micro (Auto)   Result Value Ref Range    Color, UA      Clarity, UA      Glucose, UA POC neg mg/dL    Bilirubin, UA neg     Ketones, UA neg mg/dL    Spec Grav, UA 1.015     Blood, UA POC moderate     pH, UA 7.0     Protein, UA POC neg mg/dL    Urobilinogen, UA 0.2 mg/dL    Leukocytes, UA moderate     Nitrite, UA neg      Genital herpes history: no sore on outer labia per pt, pain with urination. Will get labs.       ROS:  Review of Systems   Constitutional:  Positive for activity change. Negative for appetite change, chills, diaphoresis, fatigue, fever and unexpected weight change.   HENT:  Negative for congestion, ear discharge, ear pain, hearing loss, nosebleeds, postnasal drip, rhinorrhea, sinus pressure, sinus pain, sneezing, sore throat, tinnitus, trouble swallowing and voice change.    Eyes:  Negative for photophobia, pain, discharge, redness and itching.   Respiratory:  Negative for cough, choking, chest tightness, shortness of breath, wheezing and stridor.    Cardiovascular:  Negative for chest pain, palpitations and leg swelling.   Gastrointestinal:  Negative for abdominal pain, blood in stool, constipation, diarrhea, nausea and vomiting.   Endocrine: Negative for cold intolerance, heat

## 2024-10-22 LAB — BACTERIA UR CULT: NORMAL

## 2024-10-30 ENCOUNTER — OFFICE VISIT (OUTPATIENT)
Dept: ORTHOPEDIC SURGERY | Age: 39
End: 2024-10-30
Payer: COMMERCIAL

## 2024-10-30 VITALS — HEIGHT: 62 IN | BODY MASS INDEX: 25.76 KG/M2 | WEIGHT: 140 LBS

## 2024-10-30 DIAGNOSIS — S46.012A STRAIN OF LEFT ROTATOR CUFF CAPSULE, INITIAL ENCOUNTER: Primary | ICD-10-CM

## 2024-10-30 PROCEDURE — G8419 CALC BMI OUT NRM PARAM NOF/U: HCPCS | Performed by: ORTHOPAEDIC SURGERY

## 2024-10-30 PROCEDURE — 99214 OFFICE O/P EST MOD 30 MIN: CPT | Performed by: ORTHOPAEDIC SURGERY

## 2024-10-30 PROCEDURE — G8427 DOCREV CUR MEDS BY ELIG CLIN: HCPCS | Performed by: ORTHOPAEDIC SURGERY

## 2024-10-30 PROCEDURE — G8484 FLU IMMUNIZE NO ADMIN: HCPCS | Performed by: ORTHOPAEDIC SURGERY

## 2024-10-30 PROCEDURE — 4004F PT TOBACCO SCREEN RCVD TLK: CPT | Performed by: ORTHOPAEDIC SURGERY

## 2024-10-30 NOTE — PROGRESS NOTES
Chief Complaint    Follow-up (Left shoulder. Mri results )      History of Present Illness:  Priyanka Prince is a 39 y.o. female here today for follow up evaluation of the left shoulder. She is here to review MRI results. On 2024 she states she was pushing a skid at work and felt a pull in her left shoulder and has had pain ever since. Her pain is localized to the anterior and posterior aspect of her shoulder. This has made it very difficult for her to move her shoulder. Motions across her shoulder and behind her back are the worst. She is unable to sleep on her left side. She does report that the pain sometimes wakes her up at night. Denies numbness or tingling. She was prescribed naproxen by her PCP which has not really helped with her symptoms.    Medical History:  Patient's medications, allergies, past medical, surgical, social and family histories were reviewed and updated as appropriate.    Pertinent items are noted in HPI  Review of systems reviewed from Patient History Form completed today and available in the patient's chart under the Media tab.       Pain Assessment  Location of Pain: Shoulder  Location Modifiers: Left  Severity of Pain: 8  Quality of Pain: Sharp, Aching, Throbbing  Duration of Pain: Persistent  Frequency of Pain: Constant  Aggravating Factors:  (pushing / pulling / rotating arm)  Limiting Behavior: Yes  Relieving Factors: Rest  Result of Injury: Yes  Work-Related Injury: Yes  Are there other pain locations you wish to document?: No    Past Medical History:   Diagnosis Date    Anxiety     Chronic low back pain     Depression     Hemorrhoids     Herpes simplex virus (HSV) infection     Insomnia     Mood swing         Past Surgical History:   Procedure Laterality Date     SECTION      EYE SURGERY      EYE SURGERY         Family History   Problem Relation Age of Onset    Cancer Maternal Grandmother         lymphoma    Diabetes Maternal Grandmother     Diabetes Maternal

## 2024-11-11 ENCOUNTER — TELEPHONE (OUTPATIENT)
Dept: ORTHOPEDIC SURGERY | Age: 39
End: 2024-11-11

## 2024-11-11 NOTE — TELEPHONE ENCOUNTER
Appointment Request     Patient requesting earlier appointment: Yes  Appointment offered to patient: YES  Patient Contact Number: [phone]        IW REQ APPT TO DISCUSS BEING TAKEN OFF WORK. IW STATES SHE'S UNABLE TO PERFORM AND KEEP UP WITH DAILY WORK TASKS.    PLEASE REACH OUT TO PATIENT.

## 2024-11-12 NOTE — TELEPHONE ENCOUNTER
SPOKE WITH PATIENT  INFORMED REQUESTED TX IS DENIED FOR NOW BY   SCHEDULED FOLLOW UP APPT  WORK RESTRICTIONS LETTER SENT TO MY CHART AS REQUESTED.

## 2024-11-20 ENCOUNTER — OFFICE VISIT (OUTPATIENT)
Dept: ORTHOPEDIC SURGERY | Age: 39
End: 2024-11-20

## 2024-11-20 VITALS — WEIGHT: 143 LBS | BODY MASS INDEX: 26.31 KG/M2 | HEIGHT: 62 IN

## 2024-11-20 DIAGNOSIS — S46.012A STRAIN OF LEFT ROTATOR CUFF CAPSULE, INITIAL ENCOUNTER: Primary | ICD-10-CM

## 2024-11-20 NOTE — PROGRESS NOTES
were all described to the patient and questions were answered.    Patient's MRI shows a frayed glenoid labrum as well as irritation of the rotator cuff. Physical therapy and corticosteroid injection were denied through worker's comp. Her symptoms and exam today remain consistent with her diagnosis but her shoulder does seem to be tightening up. Still would recommend formal, supervised physical therapy for flexibility and strengthening as well as a corticosteroid injection for her pain and inflammation. We will continue her work restrictions until reevaluation in 4-6 weeks. She can continue the naproxen as needed.    I will see her back in 4-6 weeks, sooner if symptoms worsen. Priyanka Prince is in agreement with this plan. All questions were answered to patient's satisfaction and was encouraged to call with any further questions.         I spent 30 minutes providing this service today, to include the time spent seeing the patient, documenting, reviewing chart, developing and communicating a treatment plan, excluding any separately billed procedures.        I, Shirley Hirsch ATC, am scribing for and in the presence of Dr. Pete [facility].   11/20/24 3:38 PM Shirley Hirsch ATC             I attest that I met personally with the patient, performed the described exam, reviewed the radiographic studies and medical records associated with this patient and supervised the services that are described above.     Pete [facility] MD

## 2025-01-10 RX ORDER — BUSPIRONE HYDROCHLORIDE 30 MG/1
TABLET ORAL
Qty: 60 TABLET | Refills: 5 | Status: SHIPPED | OUTPATIENT
Start: 2025-01-10

## 2025-01-10 NOTE — TELEPHONE ENCOUNTER
Medication:   Requested Prescriptions     Pending Prescriptions Disp Refills    busPIRone (BUSPAR) 30 MG tablet [Pharmacy Med Name: busPIRone HCL 30 MG TABLET] 60 tablet 5     Sig: TAKE 1 TABLET BY MOUTH EVERY MORNING AND TAKE 1 TABLET BY MOUTH EVERY NIGHT AT BEDTIME       Last Filled:  8/15/24    Patient Phone Number: [phone] (home)     Last appt: 10/21/2024   Next appt: Visit date not found    Last Labs DM: No results found for: \"LABA1C\"  Last Lipid:   Lab Results   Component Value Date/Time    CHOL 197 04/26/2022 11:57 AM    TRIG 64 04/26/2022 11:57 AM     04/26/2022 11:57 AM     Last PSA: No results found for: \"PSA\"  Last Thyroid:   Lab Results   Component Value Date/Time    TSH 2.21 04/26/2022 11:57 AM

## 2025-01-24 ENCOUNTER — TELEPHONE (OUTPATIENT)
Dept: ORTHOPEDIC SURGERY | Age: 40
End: 2025-01-24

## 2025-01-29 ENCOUNTER — OFFICE VISIT (OUTPATIENT)
Dept: ORTHOPEDIC SURGERY | Age: 40
End: 2025-01-29

## 2025-01-29 VITALS — WEIGHT: 140 LBS | BODY MASS INDEX: 25.76 KG/M2 | HEIGHT: 62 IN

## 2025-01-29 DIAGNOSIS — S43.432A TEAR OF LEFT GLENOID LABRUM, INITIAL ENCOUNTER: Primary | ICD-10-CM

## 2025-01-29 NOTE — PROGRESS NOTES
physical therapy, and surgical interventions were all described to the patient and questions were answered.    Patient's MRI shows a frayed glenoid labrum as well as irritation of the rotator cuff. Physical therapy and corticosteroid injection were denied through worker's comp. Her symptoms and exam today remain consistent with her diagnosis but her shoulder does seem to be tightening up. Recommendations would still be to start with a trial of formal, supervised physical therapy for flexibility and strengthening. We will submit to worker's comp for approval. If no improvement in her symptoms, she may be a candidate for a corticosteroid injection for her pain and inflammation. In regards to work, her job is unable to comply with restrictions that were provided therefore we will hold her out of work until reevaluation in 1 month. She can continue the Naproxen as needed.    I will see her back in 1 month, sooner if symptoms worsen. Priyanka Prince is in agreement with this plan. All questions were answered to patient's satisfaction and was encouraged to call with any further questions.         I spent 30 minutes providing this service today, to include the time spent seeing the patient, documenting, reviewing chart, developing and communicating a treatment plan, excluding any separately billed procedures.        I, Shirley Hirsch ATC, am scribing for and in the presence of Dr. Pete [facility].   01/29/25 3:09 PM GILBERTO Rivas MD  Sports Medicine, Knee and Shoulder Surgery    This dictation was performed with a verbal recognition program (DRAGON) and it was checked for errors.  It is possible that there are still dictated errors within this office note.  If so, please bring any errors to my attention for an addendum.  All efforts were made to ensure that this office note is accurate.

## 2025-02-10 ENCOUNTER — TELEPHONE (OUTPATIENT)
Dept: ORTHOPEDIC SURGERY | Age: 40
End: 2025-02-10

## 2025-02-12 RX ORDER — BUPROPION HYDROCHLORIDE 150 MG/1
150 TABLET ORAL EVERY MORNING
Qty: 30 TABLET | Refills: 3 | Status: SHIPPED | OUTPATIENT
Start: 2025-02-12

## 2025-02-12 NOTE — TELEPHONE ENCOUNTER
Medication:   Requested Prescriptions     Pending Prescriptions Disp Refills    buPROPion (WELLBUTRIN XL) 150 MG extended release tablet [Pharmacy Med Name: buPROPion HCL  MG TABLET] 30 tablet 3     Sig: TAKE 1 TABLET BY MOUTH EVERY MORNING       Last Filled:  10/14/24    Patient Phone Number: [phone] (home)     Last appt: 10/21/2024   Next appt: Visit date not found    Last Labs DM: No results found for: \"LABA1C\"  Last Lipid:   Lab Results   Component Value Date/Time    CHOL 197 04/26/2022 11:57 AM    TRIG 64 04/26/2022 11:57 AM     04/26/2022 11:57 AM     Last PSA: No results found for: \"PSA\"  Last Thyroid:   Lab Results   Component Value Date/Time    TSH 2.21 04/26/2022 11:57 AM

## 2025-02-25 ENCOUNTER — TELEPHONE (OUTPATIENT)
Dept: ORTHOPEDIC SURGERY | Age: 40
End: 2025-02-25

## 2025-02-25 NOTE — TELEPHONE ENCOUNTER
Left vm with Masoud to fax form to [phone]  Have not received a fax and nothing is scanned into her chart from the worker's comp department  The fax machine in [place] is down currently.

## 2025-02-25 NOTE — TELEPHONE ENCOUNTER
General Question     Subject: C30 STATUS  Patient and /or Facility Request: [facility]  Contact Number: p#[phone]       CALLING FOR STATUS OF C30 THAT WAS FAXED TO CLINIC ON 02.11.2025.    NEEDED FOR ALLOWANCE HEARING ON 02.28.2025.    PLS ADVISE.

## 2025-03-05 ENCOUNTER — OFFICE VISIT (OUTPATIENT)
Dept: ORTHOPEDIC SURGERY | Age: 40
End: 2025-03-05

## 2025-03-05 VITALS — BODY MASS INDEX: 25.76 KG/M2 | HEIGHT: 62 IN | WEIGHT: 140 LBS

## 2025-03-05 DIAGNOSIS — S46.012A STRAIN OF LEFT ROTATOR CUFF CAPSULE, INITIAL ENCOUNTER: Primary | ICD-10-CM

## 2025-03-05 NOTE — PROGRESS NOTES
SURGERY      EYE SURGERY  2021       Family History   Problem Relation Age of Onset    Cancer Maternal Grandmother         lymphoma    Diabetes Maternal Grandmother     Diabetes Maternal Grandfather     Depression Mother     Lupus Maternal Cousin     Mental Retardation Paternal Cousin         Mothers first cousin       Social History     Socioeconomic History    Marital status: Single     Spouse name: None    Number of children: None    Years of education: None    Highest education level: None   Tobacco Use    Smoking status: Every Day     Current packs/day: 0.50     Average packs/day: 0.5 packs/day for 25.2 years (12.6 ttl pk-yrs)     Types: Cigarettes     Start date: 1/1/2000    Smokeless tobacco: Never    Tobacco comments:     Requesting help to stop    Vaping Use    Vaping status: Never Used   Substance and Sexual Activity    Alcohol use: Yes     Comment: 6 pack once a month    Drug use: Never    Sexual activity: Not Currently     Partners: Male     Social Determinants of Health     Financial Resource Strain: Low Risk  (2/20/2023)    Overall Financial Resource Strain (CARDIA)     Difficulty of Paying Living Expenses: Not hard at all   Transportation Needs: Unknown (2/20/2023)    PRAPARE - Transportation     Lack of Transportation (Non-Medical): No   Physical Activity: Inactive (10/2/2024)    Exercise Vital Sign     Days of Exercise per Week: 0 days     Minutes of Exercise per Session: 0 min   Housing Stability: Unknown (2/20/2023)    Housing Stability Vital Sign     Unstable Housing in the Last Year: No       Current Outpatient Medications   Medication Sig Dispense Refill    buPROPion (WELLBUTRIN XL) 150 MG extended release tablet TAKE 1 TABLET BY MOUTH EVERY MORNING 30 tablet 3    busPIRone (BUSPAR) 30 MG tablet TAKE 1 TABLET BY MOUTH EVERY MORNING AND TAKE 1 TABLET BY MOUTH EVERY NIGHT AT BEDTIME 60 tablet 5    escitalopram (LEXAPRO) 20 MG tablet Take 1 tablet by mouth daily 30 tablet 3    naproxen (NAPROSYN)

## 2025-03-17 RX ORDER — ESCITALOPRAM OXALATE 20 MG/1
20 TABLET ORAL DAILY
Qty: 90 TABLET | Refills: 0 | Status: SHIPPED | OUTPATIENT
Start: 2025-03-17

## 2025-03-17 NOTE — TELEPHONE ENCOUNTER
Medication:   Requested Prescriptions     Pending Prescriptions Disp Refills    escitalopram (LEXAPRO) 20 MG tablet [Pharmacy Med Name: ESCITALOPRAM 20 MG TABLET] 90 tablet      Sig: TAKE 1 TABLET BY MOUTH DAILY        Last Filled:  10/14/24    Pended to: [facility] [id number] - [place] [id number] - P [phone] - F [phone]     Patient Phone Number: [phone] (home)     Last appt: 10/21/2024   Next appt: Visit date not found    Last OARRS:       2/18/2020    10:41 AM   RX Monitoring   Periodic Controlled Substance Monitoring Possible medication side effects, risk of tolerance/dependence & alternative treatments discussed.;No signs of potential drug abuse or diversion identified.

## 2025-03-25 ENCOUNTER — TELEPHONE (OUTPATIENT)
Dept: ORTHOPEDIC SURGERY | Age: 40
End: 2025-03-25

## 2025-03-25 NOTE — TELEPHONE ENCOUNTER
Imported medical records ([facility]) into [facility] for [place] [place] [facility] [place] - all records on file.

## 2025-03-26 ENCOUNTER — OFFICE VISIT (OUTPATIENT)
Dept: ORTHOPEDIC SURGERY | Age: 40
End: 2025-03-26

## 2025-03-26 VITALS — WEIGHT: 148 LBS | BODY MASS INDEX: 27.23 KG/M2 | HEIGHT: 62 IN

## 2025-03-26 DIAGNOSIS — S43.432A TEAR OF LEFT GLENOID LABRUM, INITIAL ENCOUNTER: ICD-10-CM

## 2025-03-26 DIAGNOSIS — S46.012A STRAIN OF LEFT ROTATOR CUFF CAPSULE, INITIAL ENCOUNTER: Primary | ICD-10-CM

## 2025-03-26 NOTE — PROGRESS NOTES
Chief Complaint    Injury (WC  LEFT SHOULDER PAIN)      History of Present Illness:  Priyanka Prince is a 40 y.o. female here today for follow up evaluation of the left shoulder. On 2024 she states she was pulling a skid at work and felt a pull in her left shoulder and has had pain ever since. This was witnessed by a coworker and she denies any prior shoulder symptoms. Her symptoms remain unchanged and she feels her range of motion is worsening. Her pain is localized to the anterior and posterior aspect of her shoulder. This has made it very difficult for her to move her shoulder. Motions across her shoulder and behind her back are the worst. She is unable to sleep on her left side. She does report that the pain sometimes wakes her up at night. Denies numbness or tingling. She was prescribed naproxen by her PCP which has not really helped with her symptoms. A corticosteroid injection and physical therapy were recommended as treatment but were denied by worker's comp.    Medical History:  Patient's medications, allergies, past medical, surgical, social and family histories were reviewed and updated as appropriate.    Pertinent items are noted in HPI  Review of systems reviewed from Patient History Form completed today and available in the patient's chart under the Media tab.            Past Medical History:   Diagnosis Date    Anxiety     Chronic low back pain     Depression     Hemorrhoids     Herpes simplex virus (HSV) infection     Insomnia     Mood swing         Past Surgical History:   Procedure Laterality Date     SECTION      EYE SURGERY      EYE SURGERY         Family History   Problem Relation Age of Onset    Cancer Maternal Grandmother         lymphoma    Diabetes Maternal Grandmother     Diabetes Maternal Grandfather     Depression Mother     Lupus Maternal Cousin     Mental Retardation Paternal Cousin         Mothers first cousin       Social History     Socioeconomic History    Marital

## 2025-03-27 RX ORDER — METHYLPREDNISOLONE 4 MG/1
TABLET ORAL
Qty: 1 KIT | Refills: 0 | Status: SHIPPED | OUTPATIENT
Start: 2025-03-27

## 2025-04-04 ENCOUNTER — TELEPHONE (OUTPATIENT)
Dept: ORTHOPEDIC SURGERY | Age: 40
End: 2025-04-04

## 2025-04-04 NOTE — TELEPHONE ENCOUNTER
----- Message from Swati NELSON sent at 4/4/2025  8:47 AM EDT -----  Regarding: Specialty Message to Provider  Specialty Message to Provider    Relationship to Patient: Self     Patient Message: PATIENT CALLING TO LET PROVIDER KNOW SHE WILL DROP OFF HER WORK RELEASE FORM TO [facility] [employer] [facility] TODAY  4/4/25 SO SHE CN RETURN TO WORK NEXT WEEK . PLEASE ADVISE   --------------------------------------------------------------------------------------------------------------------------    Call Back Information: OK to leave message on voicemail  Preferred Call Back Number: [phone]

## 2025-04-04 NOTE — TELEPHONE ENCOUNTER
Spoke with patient   She will drop off paper work to [place] office.  She wants to go back to work 04/09/25 with restrictions of no lifting over 50 lbs

## 2025-04-07 ENCOUNTER — TELEPHONE (OUTPATIENT)
Dept: ORTHOPEDIC SURGERY | Age: 40
End: 2025-04-07

## 2025-04-07 NOTE — TELEPHONE ENCOUNTER
[employer] / [employer]:    Caller: ELIU TREADWELL    Phone#: [phone]    Fax#: [phone]     [employer] Date of Service:  3.26.2025    REASON FOR CALL:         [employer]/WORK[employer] Form Missing

## 2025-05-28 ENCOUNTER — OFFICE VISIT (OUTPATIENT)
Dept: ORTHOPEDIC SURGERY | Age: 40
End: 2025-05-28

## 2025-05-28 DIAGNOSIS — S46.012A STRAIN OF LEFT ROTATOR CUFF CAPSULE, INITIAL ENCOUNTER: Primary | ICD-10-CM

## 2025-05-28 DIAGNOSIS — S43.432A TEAR OF LEFT GLENOID LABRUM, INITIAL ENCOUNTER: ICD-10-CM

## 2025-05-28 RX ORDER — MELOXICAM 15 MG/1
15 TABLET ORAL DAILY
Qty: 30 TABLET | Refills: 3 | Status: SHIPPED | OUTPATIENT
Start: 2025-05-28

## 2025-05-28 NOTE — PROGRESS NOTES
Chief Complaint    Follow-up (Left shoulder. WC )      History of Present Illness:  History of Present Illness  The patient is here for a follow-up evaluation of her left shoulder. She had a rotator cuff strain and some anterior labral fraying, which were confirmed on MRI. Symptoms date back to 2024 when she felt a pop in her shoulder while pulling on a skid. She has been largely self-managing with over-the-counter anti-inflammatory medications since then. She has been awaiting a hearing to allow her to start physical therapy. The hearing results were favorable, but now approval from [facility] is pending.    In the meantime, she has been performing exercises on her own. She experiences some popping and snapping during these exercises but finds that they provide some relief. She estimates her current function at about 60% of her normal level.    SOCIAL HISTORY  Exercise: Exercises on her own    Medical History:  Patient's medications, allergies, past medical, surgical, social and family histories were reviewed and updated as appropriate.    Pertinent items are noted in HPI  Review of systems reviewed from Patient History Form completed today and available in the patient's chart under the Media tab.            Past Medical History:   Diagnosis Date    Anxiety     Chronic low back pain     Depression     Hemorrhoids     Herpes simplex virus (HSV) infection     Insomnia     Mood swing         Past Surgical History:   Procedure Laterality Date     SECTION      EYE SURGERY      EYE SURGERY         Family History   Problem Relation Age of Onset    Cancer Maternal Grandmother         lymphoma    Diabetes Maternal Grandmother     Diabetes Maternal Grandfather     Depression Mother     Lupus Maternal Cousin     Mental Retardation Paternal Cousin         Mothers first cousin       Social History     Socioeconomic History    Marital status: Single   Tobacco Use    Smoking status: Every Day     Current packs/day:

## 2025-06-12 NOTE — TELEPHONE ENCOUNTER
Medication:   Requested Prescriptions     Pending Prescriptions Disp Refills    escitalopram (LEXAPRO) 20 MG tablet [Pharmacy Med Name: ESCITALOPRAM 20 MG TABLET] 90 tablet 0     Sig: TAKE 1 TABLET BY MOUTH DAILY     Last Filled:  03/17/25    Last appt: 10/21/2024   Next appt: Visit date not found    Last OARRS:       2/18/2020    10:41 AM   RX Monitoring   Periodic Controlled Substance Monitoring Possible medication side effects, risk of tolerance/dependence & alternative treatments discussed.;No signs of potential drug abuse or diversion identified.     
gradual onset

## 2025-06-13 RX ORDER — ESCITALOPRAM OXALATE 20 MG/1
20 TABLET ORAL DAILY
Qty: 90 TABLET | Refills: 0 | Status: SHIPPED | OUTPATIENT
Start: 2025-06-13

## 2025-06-24 RX ORDER — BUPROPION HYDROCHLORIDE 150 MG/1
150 TABLET ORAL EVERY MORNING
Qty: 30 TABLET | Refills: 3 | Status: SHIPPED | OUTPATIENT
Start: 2025-06-24

## 2025-06-25 ENCOUNTER — OFFICE VISIT (OUTPATIENT)
Dept: ORTHOPEDIC SURGERY | Age: 40
End: 2025-06-25

## 2025-06-25 VITALS — BODY MASS INDEX: 25.76 KG/M2 | HEIGHT: 62 IN | WEIGHT: 140 LBS

## 2025-06-25 DIAGNOSIS — S46.012A STRAIN OF LEFT ROTATOR CUFF CAPSULE, INITIAL ENCOUNTER: Primary | ICD-10-CM

## 2025-06-25 RX ORDER — ACETAMINOPHEN 500 MG
TABLET ORAL
COMMUNITY
Start: 2025-05-15

## 2025-06-25 RX ORDER — TRIAZOLAM 0.25 MG
TABLET ORAL
COMMUNITY
Start: 2025-04-17

## 2025-06-25 RX ORDER — IBUPROFEN 800 MG/1
TABLET, FILM COATED ORAL
COMMUNITY
Start: 2025-05-15

## 2025-06-25 NOTE — PROGRESS NOTES
Chief Complaint    Follow-up (Left shoulder. WC )      History of Present Illness:  History of Present Illness  The patient presents for a follow-up of left shoulder pain.    She was last seen on 05/28/2025 for rotator cuff strain and labral fraying. She reports experiencing mild soreness in her left shoulder, which she attributes to work-related activities. Despite this, she notes an overall improvement in her condition compared to the previous month, with a significant regain in her range of motion. She estimates her current functionality at 80 percent of normal, a marked improvement from the 60 percent reported during her last visit. She has not experienced any new injuries or setbacks. She is able to lift her arm over her head without discomfort but acknowledges some residual tightness. She has been using meloxicam as needed for pain management and plans to take a dose after today's visit. She expresses concern about potential liver damage from daily use of meloxicam.     She recalls that her left arm has not fully recovered since the initial injury in 09/2024, describing it as feeling different from her right arm. She believes that her recent two-month break from work has facilitated the healing process. She has not yet started physical therapy and is awaiting approval of her claim. She has a hearing scheduled for Wednesday. She confirms that she did not experience left shoulder pain prior to the injury and had not sought treatment for it before.    Medical History:  Patient's medications, allergies, past medical, surgical, social and family histories were reviewed and updated as appropriate.    Pertinent items are noted in HPI  Review of systems reviewed from Patient History Form completed today and available in the patient's chart under the Media tab.       Pain Assessment  Location of Pain: Shoulder  Location Modifiers: Left  Severity of Pain: 4  Quality of Pain: Aching  Frequency of Pain:

## 2025-08-05 DIAGNOSIS — S43.432A TEAR OF LEFT GLENOID LABRUM, INITIAL ENCOUNTER: Primary | ICD-10-CM

## 2025-08-07 ENCOUNTER — HOSPITAL ENCOUNTER (OUTPATIENT)
Dept: PHYSICAL THERAPY | Age: 40
Setting detail: THERAPIES SERIES
Discharge: HOME | End: 2025-08-07
Attending: ORTHOPAEDIC SURGERY

## 2025-08-07 DIAGNOSIS — S43.432A TEAR OF LEFT GLENOID LABRUM, INITIAL ENCOUNTER: Primary | ICD-10-CM

## 2025-08-07 PROCEDURE — 97112 NEUROMUSCULAR REEDUCATION: CPT

## 2025-08-07 PROCEDURE — 97161 PT EVAL LOW COMPLEX 20 MIN: CPT

## 2025-08-12 ENCOUNTER — HOSPITAL ENCOUNTER (OUTPATIENT)
Dept: PHYSICAL THERAPY | Age: 40
Setting detail: THERAPIES SERIES
Discharge: HOME | End: 2025-08-12
Attending: ORTHOPAEDIC SURGERY
Payer: COMMERCIAL

## 2025-08-12 PROCEDURE — 97112 NEUROMUSCULAR REEDUCATION: CPT

## 2025-08-12 PROCEDURE — 97530 THERAPEUTIC ACTIVITIES: CPT

## 2025-08-13 ENCOUNTER — OFFICE VISIT (OUTPATIENT)
Dept: ORTHOPEDIC SURGERY | Age: 40
End: 2025-08-13

## 2025-08-13 VITALS — BODY MASS INDEX: 24.84 KG/M2 | HEIGHT: 62 IN | WEIGHT: 135 LBS

## 2025-08-13 DIAGNOSIS — S46.012A STRAIN OF LEFT ROTATOR CUFF CAPSULE, INITIAL ENCOUNTER: Primary | ICD-10-CM

## 2025-08-13 DIAGNOSIS — S43.432A TEAR OF LEFT GLENOID LABRUM, INITIAL ENCOUNTER: ICD-10-CM

## 2025-08-14 ENCOUNTER — HOSPITAL ENCOUNTER (OUTPATIENT)
Dept: PHYSICAL THERAPY | Age: 40
Setting detail: THERAPIES SERIES
Discharge: HOME | End: 2025-08-14
Attending: ORTHOPAEDIC SURGERY
Payer: COMMERCIAL

## 2025-08-14 PROCEDURE — 97112 NEUROMUSCULAR REEDUCATION: CPT

## 2025-08-14 PROCEDURE — 97530 THERAPEUTIC ACTIVITIES: CPT

## 2025-08-18 ENCOUNTER — HOSPITAL ENCOUNTER (OUTPATIENT)
Dept: PHYSICAL THERAPY | Age: 40
Setting detail: THERAPIES SERIES
Discharge: HOME | End: 2025-08-18
Attending: ORTHOPAEDIC SURGERY
Payer: COMMERCIAL

## 2025-08-18 PROCEDURE — 97110 THERAPEUTIC EXERCISES: CPT

## 2025-08-18 PROCEDURE — 97112 NEUROMUSCULAR REEDUCATION: CPT

## 2025-08-18 PROCEDURE — 97530 THERAPEUTIC ACTIVITIES: CPT

## 2025-08-21 ENCOUNTER — HOSPITAL ENCOUNTER (OUTPATIENT)
Dept: PHYSICAL THERAPY | Age: 40
Setting detail: THERAPIES SERIES
Discharge: HOME | End: 2025-08-21
Attending: ORTHOPAEDIC SURGERY
Payer: COMMERCIAL

## 2025-08-21 PROCEDURE — 97530 THERAPEUTIC ACTIVITIES: CPT

## 2025-08-21 PROCEDURE — 97110 THERAPEUTIC EXERCISES: CPT

## 2025-08-21 PROCEDURE — 97112 NEUROMUSCULAR REEDUCATION: CPT

## 2025-08-25 ENCOUNTER — HOSPITAL ENCOUNTER (OUTPATIENT)
Dept: PHYSICAL THERAPY | Age: 40
Setting detail: THERAPIES SERIES
Discharge: HOME | End: 2025-08-25
Attending: ORTHOPAEDIC SURGERY
Payer: COMMERCIAL

## 2025-08-25 PROCEDURE — 97112 NEUROMUSCULAR REEDUCATION: CPT

## 2025-08-25 PROCEDURE — 97110 THERAPEUTIC EXERCISES: CPT

## 2025-08-25 PROCEDURE — 97530 THERAPEUTIC ACTIVITIES: CPT

## 2025-08-27 ENCOUNTER — HOSPITAL ENCOUNTER (OUTPATIENT)
Dept: PHYSICAL THERAPY | Age: 40
Setting detail: THERAPIES SERIES
Discharge: HOME | End: 2025-08-27
Attending: ORTHOPAEDIC SURGERY
Payer: COMMERCIAL

## 2025-08-27 PROCEDURE — 97112 NEUROMUSCULAR REEDUCATION: CPT

## 2025-08-27 PROCEDURE — 97110 THERAPEUTIC EXERCISES: CPT

## 2025-08-27 PROCEDURE — 97530 THERAPEUTIC ACTIVITIES: CPT

## 2025-09-03 ENCOUNTER — HOSPITAL ENCOUNTER (OUTPATIENT)
Dept: PHYSICAL THERAPY | Age: 40
Setting detail: THERAPIES SERIES
Discharge: HOME | End: 2025-09-03
Attending: ORTHOPAEDIC SURGERY
Payer: COMMERCIAL

## 2025-09-03 PROCEDURE — 97530 THERAPEUTIC ACTIVITIES: CPT

## 2025-09-03 PROCEDURE — 97112 NEUROMUSCULAR REEDUCATION: CPT

## 2025-09-03 PROCEDURE — 97110 THERAPEUTIC EXERCISES: CPT

## 2025-09-05 ENCOUNTER — HOSPITAL ENCOUNTER (OUTPATIENT)
Dept: PHYSICAL THERAPY | Age: 40
Setting detail: THERAPIES SERIES
Discharge: HOME | End: 2025-09-05
Attending: ORTHOPAEDIC SURGERY
Payer: COMMERCIAL

## 2025-09-05 PROCEDURE — 97112 NEUROMUSCULAR REEDUCATION: CPT

## 2025-09-05 PROCEDURE — 97530 THERAPEUTIC ACTIVITIES: CPT

## 2025-09-05 PROCEDURE — 97110 THERAPEUTIC EXERCISES: CPT
